# Patient Record
Sex: MALE | Race: OTHER | Employment: FULL TIME | URBAN - METROPOLITAN AREA
[De-identification: names, ages, dates, MRNs, and addresses within clinical notes are randomized per-mention and may not be internally consistent; named-entity substitution may affect disease eponyms.]

---

## 2019-07-17 ENCOUNTER — HOSPITAL ENCOUNTER (EMERGENCY)
Age: 34
Discharge: HOME HEALTH | End: 2019-07-17
Attending: EMERGENCY MEDICINE
Payer: COMMERCIAL

## 2019-07-17 ENCOUNTER — OFFICE VISIT (OUTPATIENT)
Dept: URGENT CARE | Age: 34
End: 2019-07-17

## 2019-07-17 VITALS
RESPIRATION RATE: 15 BRPM | HEART RATE: 72 BPM | WEIGHT: 309.53 LBS | HEIGHT: 68 IN | DIASTOLIC BLOOD PRESSURE: 79 MMHG | SYSTOLIC BLOOD PRESSURE: 124 MMHG | OXYGEN SATURATION: 98 % | BODY MASS INDEX: 46.91 KG/M2 | TEMPERATURE: 98.4 F

## 2019-07-17 DIAGNOSIS — R73.9 HYPERGLYCEMIA: Primary | ICD-10-CM

## 2019-07-17 DIAGNOSIS — R79.89 ELEVATED LFTS: ICD-10-CM

## 2019-07-17 LAB
ALBUMIN SERPL-MCNC: 3.7 G/DL (ref 3.5–5)
ALBUMIN/GLOB SERPL: 0.9 {RATIO} (ref 1.1–2.2)
ALP SERPL-CCNC: 111 U/L (ref 45–117)
ALT SERPL-CCNC: 122 U/L (ref 12–78)
ANION GAP SERPL CALC-SCNC: 6 MMOL/L (ref 5–15)
AST SERPL-CCNC: 51 U/L (ref 15–37)
BASOPHILS # BLD: 0 K/UL (ref 0–0.1)
BASOPHILS NFR BLD: 0 % (ref 0–1)
BILIRUB SERPL-MCNC: 1.2 MG/DL (ref 0.2–1)
BUN SERPL-MCNC: 15 MG/DL (ref 6–20)
BUN/CREAT SERPL: 14 (ref 12–20)
CALCIUM SERPL-MCNC: 8.9 MG/DL (ref 8.5–10.1)
CHLORIDE SERPL-SCNC: 103 MMOL/L (ref 97–108)
CO2 SERPL-SCNC: 25 MMOL/L (ref 21–32)
CREAT SERPL-MCNC: 1.11 MG/DL (ref 0.7–1.3)
DIFFERENTIAL METHOD BLD: NORMAL
EOSINOPHIL # BLD: 0.1 K/UL (ref 0–0.4)
EOSINOPHIL NFR BLD: 1 % (ref 0–7)
ERYTHROCYTE [DISTWIDTH] IN BLOOD BY AUTOMATED COUNT: 11.9 % (ref 11.5–14.5)
GLOBULIN SER CALC-MCNC: 4.3 G/DL (ref 2–4)
GLUCOSE SERPL-MCNC: 254 MG/DL (ref 65–100)
HCG SERPL QL: NEGATIVE
HCT VFR BLD AUTO: 45.7 % (ref 36.6–50.3)
HGB BLD-MCNC: 15.3 G/DL (ref 12.1–17)
IMM GRANULOCYTES # BLD AUTO: 0 K/UL (ref 0–0.04)
IMM GRANULOCYTES NFR BLD AUTO: 0 % (ref 0–0.5)
LYMPHOCYTES # BLD: 1.8 K/UL (ref 0.8–3.5)
LYMPHOCYTES NFR BLD: 27 % (ref 12–49)
MCH RBC QN AUTO: 29.8 PG (ref 26–34)
MCHC RBC AUTO-ENTMCNC: 33.5 G/DL (ref 30–36.5)
MCV RBC AUTO: 88.9 FL (ref 80–99)
MONOCYTES # BLD: 0.5 K/UL (ref 0–1)
MONOCYTES NFR BLD: 7 % (ref 5–13)
NEUTS SEG # BLD: 4.3 K/UL (ref 1.8–8)
NEUTS SEG NFR BLD: 65 % (ref 32–75)
NRBC # BLD: 0 K/UL (ref 0–0.01)
NRBC BLD-RTO: 0 PER 100 WBC
PLATELET # BLD AUTO: 273 K/UL (ref 150–400)
PMV BLD AUTO: 9.9 FL (ref 8.9–12.9)
POTASSIUM SERPL-SCNC: 3.9 MMOL/L (ref 3.5–5.1)
PROT SERPL-MCNC: 8 G/DL (ref 6.4–8.2)
RBC # BLD AUTO: 5.14 M/UL (ref 4.1–5.7)
SODIUM SERPL-SCNC: 134 MMOL/L (ref 136–145)
WBC # BLD AUTO: 6.7 K/UL (ref 4.1–11.1)

## 2019-07-17 PROCEDURE — 36415 COLL VENOUS BLD VENIPUNCTURE: CPT

## 2019-07-17 PROCEDURE — 74011250637 HC RX REV CODE- 250/637: Performed by: EMERGENCY MEDICINE

## 2019-07-17 PROCEDURE — 85025 COMPLETE CBC W/AUTO DIFF WBC: CPT

## 2019-07-17 PROCEDURE — 84703 CHORIONIC GONADOTROPIN ASSAY: CPT

## 2019-07-17 PROCEDURE — 99284 EMERGENCY DEPT VISIT MOD MDM: CPT

## 2019-07-17 PROCEDURE — 80053 COMPREHEN METABOLIC PANEL: CPT

## 2019-07-17 RX ORDER — METFORMIN HYDROCHLORIDE 500 MG/1
1000 TABLET ORAL
Status: COMPLETED | OUTPATIENT
Start: 2019-07-17 | End: 2019-07-17

## 2019-07-17 RX ORDER — METFORMIN HYDROCHLORIDE 500 MG/1
1000 TABLET ORAL 2 TIMES DAILY WITH MEALS
Qty: 120 TAB | Refills: 1 | Status: SHIPPED | OUTPATIENT
Start: 2019-07-17 | End: 2019-07-30 | Stop reason: DRUGHIGH

## 2019-07-17 RX ADMIN — METFORMIN HYDROCHLORIDE 1000 MG: 500 TABLET ORAL at 12:58

## 2019-07-17 NOTE — DISCHARGE INSTRUCTIONS
Patient Education        Learning About High Blood Sugar  What is high blood sugar? Your body turns the food you eat into glucose (sugar), which it uses for energy. But if your body isn't able to use the sugar right away, it can build up in your blood and lead to high blood sugar. When the amount of sugar in your blood stays too high for too much of the time, you may have diabetes. Diabetes is a disease that can cause serious health problems. The good news is that lifestyle changes may help you get your blood sugar back to normal and avoid or delay diabetes. What causes high blood sugar? Sugar (glucose) can build up in your blood if you:  · Are overweight. · Have a family history of diabetes. · Take certain medicines, such as steroids. What are the symptoms? Having high blood sugar may not cause any symptoms at all. Or it may make you feel very thirsty or very hungry. You may also urinate more often than usual, have blurry vision, or lose weight without trying. How is high blood sugar treated? You can take steps to lower your blood sugar level if you understand what makes it get higher. Your doctor may want you to learn how to test your blood sugar level at home. Then you can see how illness, stress, or different kinds of food or medicine raise or lower your blood sugar level. Other tests may be needed to see if you have diabetes. How can you prevent high blood sugar? · Watch your weight. If you're overweight, losing just a small amount of weight may help. Reducing fat around your waist is most important. · Limit the amount of calories, sweets, and unhealthy fat you eat. Ask your doctor if a dietitian can help you. A registered dietitian can help you create meal plans that fit your lifestyle. · Get at least 30 minutes of exercise on most days of the week. Exercise helps control your blood sugar. It also helps you maintain a healthy weight. Walking is a good choice.  You also may want to do other activities, such as running, swimming, cycling, or playing tennis or team sports. · If your doctor prescribed medicines, take them exactly as prescribed. Call your doctor if you think you are having a problem with your medicine. You will get more details on the specific medicines your doctor prescribes. Follow-up care is a key part of your treatment and safety. Be sure to make and go to all appointments, and call your doctor if you are having problems. It's also a good idea to know your test results and keep a list of the medicines you take. Where can you learn more? Go to http://rocael-rory.info/. Enter O108 in the search box to learn more about \"Learning About High Blood Sugar. \"  Current as of: July 25, 2018  Content Version: 11.9  © 2575-3100 [employer]. Care instructions adapted under license by [employer] (which disclaims liability or warranty for this information). If you have questions about a medical condition or this instruction, always ask your healthcare professional. [place] [address] any warranty or liability for your use of this information. Patient Education        Liver Function Tests: About These Tests  What is it? Liver function tests check how well your liver is working. Some tests measure the amount of certain enzymes in your blood to check whether the liver is damaged or inflamed. Other tests measure how well the liver can make important proteins or clear waste products from the body. Your doctor will use the test results to help look for certain conditions, such as hepatitis, cirrhosis, or gallbladder problems. Test results that are not normal do not always mean there is a problem with your liver. Your doctor can determine if there is a problem based on your results. The tests may include:  · Alkaline phosphatase (ALP). This test measures the amount of the enzyme ALP in your blood. · Total protein.  A total serum protein test measures the amounts of two major groups of proteins in your blood (albumin and globulin) and the total amount of protein. · Bilirubin. This test measures the amount of bilirubin in your blood. When bilirubin levels are high, the skin and whites of the eyes may appear yellow (jaundice). This may be caused by liver disease. · Aspartate aminotransferase (AST) and alanine aminotransferase (ALT). These tests measure the amount of the enzymes AST and ALT in your blood. (Aminotransferase is also known as a transaminase. High levels may be called transaminitis.)  Why is this test done? Liver function tests check how well your liver is working. Some tests help show whether your liver is damaged or inflamed. Your doctor may order liver function tests if you have symptoms of liver disease. These tests also may be done to see how well a treatment for liver disease is working. How can you prepare for the test?  In general, you do not need to prepare before having these tests. Your doctor may give you some specific instructions to prepare. What happens during the test?  A health professional takes a sample of your blood. What happens after the test?  You will probably be able to go home right away. When should you call for help? Watch closely for changes in your health, and be sure to contact your doctor if you have any problems. Follow-up care is a key part of your treatment and safety. Be sure to make and go to all appointments, and call your doctor if you are having problems. It's also a good idea to keep a list of the medicines you take. Ask your doctor when you can expect to have your test results. Where can you learn more? Go to http://rocael-rory.info/. Enter O816 in the search box to learn more about \"Liver Function Tests: About These Tests. \"  Current as of: June 25, 2018  Content Version: 11.9  © 7002-1426 [employer].  Care instructions adapted under license by [employer] (which disclaims liability or warranty for this information). If you have questions about a medical condition or this instruction, always ask your healthcare professional. [address] any warranty or liability for your use of this information.

## 2019-07-17 NOTE — PROGRESS NOTES
CM asked to see pt by MD due to need for PCP who takes current insurance. Pt does have an apt with Dr. Britton Pugh with [facility] for Oct 21,2:00pm  CM did reach out to MD office, per staff New pt appts are booked out until Oct,2019. CM did ask pt permission to reach out to [facility] for earlier apt if possible. Pt is agreeable. 1:50 CM contacted [facility], CM able to get apt with Dr. Zi Sexton for Sept. 26 at 11:00am. CM will relay message to pt. CM offered pt resources for free and low cost healthcare in the [facility]. Pt did accept resources. CM offered pt contact information to Diabetes treatment team. Pt is willing to see treatment team for education. CM left VM with Diabetes treatment team for pt education needs as well as possible need for medication assistance. 1:54 CM reached out to pt updated on apt date and time with [facility], CM did provide pt with [facility] phone number to contact to check on cancellations. CM updated pt on outreach to Diabetes treatment team. Pt verbalized understanding and appreciative of assitance    CM to remain available for support as needed    Cheryle Rilee.  RN, BSN  [facility]  [phone]

## 2019-07-17 NOTE — ED PROVIDER NOTES
EMERGENCY DEPARTMENT HISTORY AND PHYSICAL EXAM      Date: 7/17/2019  Patient Name: Ivy Phipps    History of Presenting Illness     Chief Complaint   Patient presents with    High Blood Sugar     Ambulatory into the ED with c/o BG in the 300's x last week. No obvious distress noted. History Provided By: Patient    HPI: Ivy Phipps, 37 y.o. male with PMHx significant for DM Type 2, without his metformin with elevated blood sugar and referred for blood sugar control. He tried to see PMD but they would not accept due to his Medicaid status and advised to go to ED. No fever, no cp/sob/abd pn or any other pain. Some mild increased urination. There are no other complaints, changes, or physical findings at this time. PCP: None    No current facility-administered medications on file prior to encounter. No current outpatient medications on file prior to encounter. Past History     Past Medical History:  No past medical history on file. Past Surgical History:  No past surgical history on file. Family History:  No family history on file. Social History:  Social History     Tobacco Use    Smoking status: Not on file   Substance Use Topics    Alcohol use: Not on file    Drug use: Not on file       Allergies:  No Known Allergies      Review of Systems   Review of Systems   Constitutional: Negative. HENT: Negative. Respiratory: Negative. Cardiovascular: Negative. Endocrine: Positive for polyuria. Genitourinary: Negative. Neurological: Negative. Hematological: Negative. All other systems reviewed and are negative. Physical Exam   Physical Exam   Vital signs and nursing notes reviewed    CONSTITUTIONAL: Alert, in mild distress; well-developed;  well-nourished. Obese body habitus  HEAD:  Normocephalic, atraumatic  EYES: PERRL; EOM's intact. ENTM: Nose: no rhinorrhea; Throat: no erythema or exudate, mucous membranes moist  Neck:  Supple. trachea is midline.   RESP: Chest clear, equal breath sounds. - W/R/R  CV: S1 and S2 WNL; No murmurs, gallops or rubs. 2+ radial and DP pulses bilaterally. GI: non-distended, normal bowel sounds, abdomen soft and non-tender. No masses or organomegaly. : No costo-vertebral angle tenderness. BACK:  Non-tender, normal appearance  UPPER EXT:  Normal inspection. no joint or soft tissue swelling  LOWER EXT: No edema, no calf tenderness. NEURO: Alert and oriented x3, 5/5 strength and light touch sensation intact in bilateral upper and lower extremities. SKIN: No rashes; Warm and dry  PSYCH: Normal mood, normal affect      Diagnostic Study Results     Labs -     Recent Results (from the past 12 hour(s))   CBC WITH AUTOMATED DIFF    Collection Time: 07/17/19 11:42 AM   Result Value Ref Range    WBC 6.7 4.1 - 11.1 K/uL    RBC 5.14 4.10 - 5.70 M/uL    HGB 15.3 12.1 - 17.0 g/dL    HCT 45.7 36.6 - 50.3 %    MCV 88.9 80.0 - 99.0 FL    MCH 29.8 26.0 - 34.0 PG    MCHC 33.5 30.0 - 36.5 g/dL    RDW 11.9 11.5 - 14.5 %    PLATELET 732 537 - 249 K/uL    MPV 9.9 8.9 - 12.9 FL    NRBC 0.0 0  WBC    ABSOLUTE NRBC 0.00 0.00 - 0.01 K/uL    NEUTROPHILS 65 32 - 75 %    LYMPHOCYTES 27 12 - 49 %    MONOCYTES 7 5 - 13 %    EOSINOPHILS 1 0 - 7 %    BASOPHILS 0 0 - 1 %    IMMATURE GRANULOCYTES 0 0.0 - 0.5 %    ABS. NEUTROPHILS 4.3 1.8 - 8.0 K/UL    ABS. LYMPHOCYTES 1.8 0.8 - 3.5 K/UL    ABS. MONOCYTES 0.5 0.0 - 1.0 K/UL    ABS. EOSINOPHILS 0.1 0.0 - 0.4 K/UL    ABS. BASOPHILS 0.0 0.0 - 0.1 K/UL    ABS. IMM.  GRANS. 0.0 0.00 - 0.04 K/UL    DF AUTOMATED     METABOLIC PANEL, COMPREHENSIVE    Collection Time: 07/17/19 11:42 AM   Result Value Ref Range    Sodium 134 (L) 136 - 145 mmol/L    Potassium 3.9 3.5 - 5.1 mmol/L    Chloride 103 97 - 108 mmol/L    CO2 25 21 - 32 mmol/L    Anion gap 6 5 - 15 mmol/L    Glucose 254 (H) 65 - 100 mg/dL    BUN 15 6 - 20 MG/DL    Creatinine 1.11 0.70 - 1.30 MG/DL    BUN/Creatinine ratio 14 12 - 20      GFR est AA >60 >60 ml/min/1.73m2    GFR est non-AA >60 >60 ml/min/1.73m2    Calcium 8.9 8.5 - 10.1 MG/DL    Bilirubin, total 1.2 (H) 0.2 - 1.0 MG/DL    ALT (SGPT) 122 (H) 12 - 78 U/L    AST (SGOT) 51 (H) 15 - 37 U/L    Alk. phosphatase 111 45 - 117 U/L    Protein, total 8.0 6.4 - 8.2 g/dL    Albumin 3.7 3.5 - 5.0 g/dL    Globulin 4.3 (H) 2.0 - 4.0 g/dL    A-G Ratio 0.9 (L) 1.1 - 2.2     HCG QL SERUM    Collection Time: 07/17/19 11:42 AM   Result Value Ref Range    HCG, Ql. NEGATIVE          Radiologic Studies -   No orders to display     CT Results  (Last 48 hours)    None        CXR Results  (Last 48 hours)    None            Medical Decision Making   I am the first provider for this patient. I reviewed the vital signs, available nursing notes, past medical history, past surgical history, family history and social history. Vital Signs-Reviewed the patient's vital signs. Patient Vitals for the past 12 hrs:   Temp Pulse Resp BP SpO2   07/17/19 1309 98.4 °F (36.9 °C) 72 15 124/79 98 %   07/17/19 1256  83 19  98 %   07/17/19 1250  85 17  99 %   07/17/19 1245 97.9 °F (36.6 °C) 85 21 134/85 97 %   07/17/19 1224  85 17  98 %   07/17/19 1215  89 21 145/88 97 %   07/17/19 1203     98 %   07/17/19 1202  84 18  97 %   07/17/19 1200  83 24 144/83 96 %   07/17/19 1145  82 26 141/86 98 %   07/17/19 1139  83 19 124/86 97 %   07/17/19 1123 98.2 °F (36.8 °C) 83 17 (!) 146/94 100 %       Pulse Oximetry Analysis - 98% on RA    Cardiac Monitor:   Rate: 72 bpm  Rhythm: NSR        Records Reviewed: nursing notes    Provider Notes (Medical Decision Making):   37 y.o male with hyperglycemia, not DKA,  to help with DM md follow-up, provided scrip for metformin. Mild LFT elevation, d/w patient, not concerned with acute emerg today but does need outpt w/u vs [facility]. ED Course:   Initial assessment performed.  The patients presenting problems have been discussed, and they are in agreement with the care plan formulated and outlined with them. I have encouraged them to ask questions as they arise throughout their visit. Disposition:  Discharge    Discharge Note:  2:00 PM  The pt is ready for discharge. The pt's signs, symptoms, diagnosis, and discharge instructions have been discussed and pt has conveyed their understanding. The pt is to follow up as recommended or return to ER should their symptoms worsen. Plan has been discussed and pt is in agreement. PLAN:  1. Current Discharge Medication List      START taking these medications    Details   metFORMIN (GLUCOPHAGE) 500 mg tablet Take 2 Tabs by mouth two (2) times daily (with meals) for 30 days. Qty: 120 Tab, Refills: 1           2. Follow-up Information     Follow up With Specialties Details Why Contact Info    [facility] EMERGENCY DEPT Emergency Medicine  If symptoms worsen including a blood sugar greater than 350 that persists despite starting her metformin again. [address] [place] [address]  [phone]    None   Please find a new primary care physician. Please discuss your diabetic medication with them as well as your abnormal liver function seen and today's evaluation in the emergency department. You may need an outpatient ultrasound. None ([phone]) Patient stated that they have no PCP          Return to ED if worse     Diagnosis     Clinical Impression:   1. Hyperglycemia    2.  Elevated LFTs

## 2019-07-30 ENCOUNTER — OFFICE VISIT (OUTPATIENT)
Dept: INTERNAL MEDICINE CLINIC | Age: 34
End: 2019-07-30

## 2019-07-30 VITALS
BODY MASS INDEX: 47.74 KG/M2 | SYSTOLIC BLOOD PRESSURE: 122 MMHG | WEIGHT: 315 LBS | HEIGHT: 68 IN | HEART RATE: 89 BPM | RESPIRATION RATE: 18 BRPM | DIASTOLIC BLOOD PRESSURE: 82 MMHG | TEMPERATURE: 97.8 F | OXYGEN SATURATION: 99 %

## 2019-07-30 DIAGNOSIS — E11.65 UNCONTROLLED TYPE 2 DIABETES MELLITUS WITH HYPERGLYCEMIA (HCC): Primary | ICD-10-CM

## 2019-07-30 DIAGNOSIS — E66.01 OBESITY, MORBID (HCC): ICD-10-CM

## 2019-07-30 DIAGNOSIS — N52.9 ERECTILE DYSFUNCTION, UNSPECIFIED ERECTILE DYSFUNCTION TYPE: ICD-10-CM

## 2019-07-30 RX ORDER — INSULIN DEGLUDEC 100 U/ML
INJECTION, SOLUTION SUBCUTANEOUS
COMMUNITY
End: 2019-08-28 | Stop reason: SDUPTHER

## 2019-07-30 RX ORDER — METFORMIN HYDROCHLORIDE 500 MG/1
1000 TABLET, EXTENDED RELEASE ORAL 2 TIMES DAILY
Qty: 120 TAB | Refills: 4 | Status: SHIPPED | OUTPATIENT
Start: 2019-07-30 | End: 2019-12-29

## 2019-07-30 RX ORDER — SILDENAFIL CITRATE 20 MG/1
20 TABLET ORAL
Qty: 30 TAB | Refills: 3 | Status: SHIPPED | OUTPATIENT
Start: 2019-07-30 | End: 2019-11-18 | Stop reason: SDUPTHER

## 2019-07-30 RX ORDER — SILDENAFIL CITRATE 20 MG/1
TABLET ORAL
Refills: 3 | COMMUNITY
Start: 2019-07-05 | End: 2019-07-30 | Stop reason: SDUPTHER

## 2019-07-30 NOTE — PROGRESS NOTES
Mr. Ivy Phipps is a new patient who is here to establish care. CC:  Establish Care and Diabetes       HPI:    Diabetes for 1.5 years. Patient was seen in ER July 17th and blood sugar was in the 500 range. Prior to ER visit patient was only tresiba 24 units at night time. When first diagnosed he was only on metformin and tresiba then taken off of metformin   Since ER visit taking 1000mg BID   Reports blood sugar are in the 200 range currently. Feels well. Patient also has ED since diagnosis of diabetes. Takes cialis as needed. Able to obtain it with a coupon    Previous PCP does not accept insurance    Reviewed social hx   Reviewed family hx     Review of systems:  Constitutional: negative for fever, chills, weight loss, night sweats   Eyes : negative for vision changes, eye pain and discharge  Nose and Throat: negative for tinnitus, sore throat   Cardiovascular: negative for chest pain, palpitations and shortness of breath  Respiratory: negative for shortness of breath, cough and wheezing   Gastroinstestinal: negative for abdominal pain, nausea, vomiting, diarrhea, constipation, and blood in the stool  Musculoskeletal: negative for back ache and joint ache   Genitourinary: negative for dysuria, nocturia, polyuria and hematuria   Neurologic: Negative for focal weakness, numbness or incoordination  Skin: negative for rash, pruritus  Hematologic: negative for easy bruising      Past Medical History:   Diagnosis Date    Erectile dysfunction     Type 2 diabetes mellitus ([facility] [address]         History reviewed. No pertinent surgical history. No Known Allergies    Current Outpatient Medications on File Prior to Visit   Medication Sig Dispense Refill    insulin degludec (TRESIBA FLEXTOUCH U-100) 100 unit/mL (3 mL) in by SubCUTAneous route. Indications: 35 unit       No current facility-administered medications on file prior to visit.         family history includes Diabetes in his maternal grandfather, maternal grandmother, paternal grandfather, and paternal grandmother. Social History     Socioeconomic History    Marital status:      Spouse name: Not on file    Number of children: Not on file    Years of education: Not on file    Highest education level: Not on file   Occupational History    Not on file   Social Needs    Financial resource strain: Not on file    Food insecurity:     Worry: Not on file     Inability: Not on file    Transportation needs:     Medical: Not on file     Non-medical: Not on file   Tobacco Use    Smoking status: Never Smoker    Smokeless tobacco: Never Used   Substance and Sexual Activity    Alcohol use: Yes    Drug use: Never    Sexual activity: Yes     Partners: Female   Lifestyle    Physical activity:     Days per week: Not on file     Minutes per session: Not on file    Stress: Not on file   Relationships    Social connections:     Talks on phone: Not on file     Gets together: Not on file     Attends Hindu service: Not on file     Active member of club or organization: Not on file     Attends meetings of clubs or organizations: Not on file     Relationship status: Not on file    Intimate partner violence:     Fear of current or ex partner: Not on file     Emotionally abused: Not on file     Physically abused: Not on file     Forced sexual activity: Not on file   Other Topics Concern    Not on file   Social History Narrative    Not on file       Visit Vitals  /82 (BP 1 Location: Right arm, BP Patient Position: Sitting)   Pulse 89   Temp 97.8 °F (36.6 °C) (Oral)   Resp 18   Ht 5' 8\" (1.727 m)   Wt 317 lb (143.8 kg)   SpO2 99%   BMI 48.20 kg/m²     General:  Well appearing male no acute distress  HEENT:   PERRL,normal conjunctiva. External ear and canals normal, TMs normal.  Hearing normal to voice. Nose without edema or discharge, normal septum. Lips, teeth, gums normal.  Oropharynx: no erythema, no exudates, no lesions, normal tongue. Neck:  Supple. Thyroid normal size, nontender, without nodules. No carotid bruit. No masses or lymphadenopathy  Respiratory: no respiratory distress,  no wheezing, no rhonchi, no rales. No chest wall tenderness. Cardiovascular:  RRR, normal S1S2, no murmur. Gastrointestinal: normal bowel sounds, soft, nontender, without masses. No hepatosplenomegaly. Extremities +2 pulses, no edema, normal sensation   Musculoskeletal:  Normal gait. Normal digits and nails. Normal strength and tone, no atrophy, and no abnormal movement. Skin:  No rash, no lesions, no ulcers. Skin warm, normal turgor, without induration or nodules. Neuro:  A and OX4, fluent speech, cranial nerves normal 2-12. Sensation normal to light touch. DTR symmetrical  Psych:  Normal affect      Lab Results   Component Value Date/Time    WBC 6.7 07/17/2019 11:42 AM    HGB 15.3 07/17/2019 11:42 AM    HCT 45.7 07/17/2019 11:42 AM    PLATELET 582 91/43/7739 11:42 AM    MCV 88.9 07/17/2019 11:42 AM     Lab Results   Component Value Date/Time    Sodium 134 (L) 07/17/2019 11:42 AM    Potassium 3.9 07/17/2019 11:42 AM    Chloride 103 07/17/2019 11:42 AM    CO2 25 07/17/2019 11:42 AM    Anion gap 6 07/17/2019 11:42 AM    Glucose 254 (H) 07/17/2019 11:42 AM    BUN 15 07/17/2019 11:42 AM    Creatinine 1.11 07/17/2019 11:42 AM    BUN/Creatinine ratio 14 07/17/2019 11:42 AM    GFR est AA >60 07/17/2019 11:42 AM    GFR est non-AA >60 07/17/2019 11:42 AM    Calcium 8.9 07/17/2019 11:42 AM     No results found for: CHOL, CHOLPOCT, CHOLX, CHLST, CHOLV, HDL, HDLPOC, LDL, LDLCPOC, LDLC, DLDLP, VLDLC, VLDL, TGLX, TRIGL, TRIGP, TGLPOCT, CHHD, CHHDX  No results found for: TSH, TSH2, TSH3, TSHP, TSHEXT, TSHEXT  No results found for: HBA1C, HGBE8, PHZ4AYGV, YDZ7LGQJ, ZPK6SSVJ  No results found for: VITD3, XQVID2, XQVID3, XQVID, VD3RIA                Assessment and Plan:     1. Obesity, morbid ([address]  Counseled on healthy eating, reports loosing weight in past few years.  Encouraged to exercise    2. Uncontrolled type 2 diabetes mellitus with hyperglycemia ([facility] [address]- change to long acting formulation, continue tresiba at 35 units for now and add ozempic to treat insulin resistance. Follow up in  4-6 weeks - will check cholesterol then   - metFORMIN ER (GLUCOPHAGE XR) 500 mg tablet; Take 2 Tabs by mouth two (2) times a day. Dispense: 120 Tab; Refill: 4  - semaglutide (OZEMPIC) 0.25 mg/0.2 mL (2 mg/1.5 mL) sub-q pen; 0.25 mg by SubCUTAneous route every seven (7) days. Dispense: 1 Box; Refill: 1    3. Erectile dysfunction, unspecified erectile dysfunction type  - sildenafil, antihypertensive, (REVATIO) 20 mg tablet; Take 1 Tab by mouth daily as needed (erectile dysfunction). Dispense: 30 Tab; Refill: 3    4. Mild elevation in liver enzymes: I suspect this is due to fatty liver, asymptomatic. Will repeat at follow up / if persists will order US and hep serologies    Follow-up and Dispositions    · Return in about 1 month (around 8/27/2019) for diabetes .           Christiano Khan MD

## 2019-07-30 NOTE — PROGRESS NOTES
Reviewed record in preparation for visit and have obtained necessary documentation. Identified pt with two pt identifiers(name and ). Chief Complaint   Patient presents with   [facility]       Health Maintenance Due   Topic Date Due    DTaP/Tdap/Td  (1 - Tdap) 2004       Mr. Ny Arenas has a reminder for a \"due or due soon\" health maintenance. I have asked that he discuss this further with his primary care provider for follow-up on this health maintenance. Coordination of Care Questionnaire:  :     1) Have you been to an emergency room, urgent care clinic since your last visit? no   Hospitalized since your last visit? no             2) Have you seen or consulted any other health care providers outside of [address] [place] [address] since your last visit? no  (Include any pap smears or colon screenings in this section.)    3) In the event something were to happen to you and you were unable to speak on your behalf, do you have an Advance Directive/ Living Will in place stating your wishes? NO    Do you have an Advance Directive on file? no    4) Are you interested in receiving information on Advance Directives? NO    Patient is accompanied by self I have received verbal consent from Maximiliano Pozo to discuss any/all medical information while they are present in the room.

## 2019-07-30 NOTE — PATIENT INSTRUCTIONS
Start ozempic once a week - typical to have nausea when you first start medication, eat small meals  Changed the formulation of metformin to long acting   Continue 25 units of tresiba    [employer] calls/patient messages:            Please allow up to 24 hours for someone in the office to contact you about your call or message. Be mindful your provider may be out of the office or your message may require further review. We encourage you to use nScaled for your messages as this is a faster, more efficient way to communicate with our office                         Medication Refills:            Prescription medications require 48-72 business hours to process. We encourage you to use nScaled for your refills. For controlled medications: Please allow 72 business hours to process. Certain medications may require you to  a written prescription at our office. NO narcotic/controlled medications will be prescribed after 4pm Monday through Friday or on weekends              Form/Paperwork Completion:            Please note a $25 fee may incur for all paperwork for completed by our providers. We ask that you allow 7-10 business days. Pre-payment is due prior to picking up/faxing the completed form. You may also download your forms to nScaled to have your doctor print off.

## 2019-08-28 NOTE — TELEPHONE ENCOUNTER
----- Message from Gina Murphy sent at 8/28/2019  3:00 PM EDT -----  Regarding: Dr. Ortiz Grief (if not patient):      Relationship of caller (if not patient):      Best contact number(s):      Name of medication and dosage if known:      Is patient out of this medication (yes/no):      Pharmacy name:[facility]     Pharmacy listed in chart? (yes/no):yes  Pharmacy phone number: [phone]      Details to clarify the request: Patient needs a refill of Orquidea Terrazas

## 2019-08-29 RX ORDER — INSULIN DEGLUDEC 100 U/ML
35 INJECTION, SOLUTION SUBCUTANEOUS DAILY
Qty: 4 PEN | Refills: 4 | Status: SHIPPED | OUTPATIENT
Start: 2019-08-29 | End: 2019-12-05 | Stop reason: ALTCHOICE

## 2019-08-30 ENCOUNTER — OFFICE VISIT (OUTPATIENT)
Dept: INTERNAL MEDICINE CLINIC | Age: 34
End: 2019-08-30

## 2019-08-30 VITALS
RESPIRATION RATE: 18 BRPM | SYSTOLIC BLOOD PRESSURE: 127 MMHG | OXYGEN SATURATION: 99 % | HEIGHT: 68 IN | DIASTOLIC BLOOD PRESSURE: 85 MMHG | WEIGHT: 315 LBS | HEART RATE: 100 BPM | TEMPERATURE: 98.1 F | BODY MASS INDEX: 47.74 KG/M2

## 2019-08-30 DIAGNOSIS — E66.01 OBESITY, MORBID (HCC): ICD-10-CM

## 2019-08-30 DIAGNOSIS — Z23 ENCOUNTER FOR IMMUNIZATION: ICD-10-CM

## 2019-08-30 DIAGNOSIS — E11.65 UNCONTROLLED TYPE 2 DIABETES MELLITUS WITH HYPERGLYCEMIA (HCC): Primary | ICD-10-CM

## 2019-08-30 NOTE — PATIENT INSTRUCTIONS
Your numbers look a lot better! Continue same regimen  We may add cholesterol medication pending results    May take zantac over the counter 150mg twice a day for reflux    Office Policies    Phone calls/patient messages:            Please allow up to 24 hours for someone in the office to contact you about your call or message. Be mindful your provider may be out of the office or your message may require further review. We encourage you to use [employer] for your messages as this is a faster, more efficient way to communicate with our office                         Medication Refills:            Prescription medications require 48-72 business hours to process. We encourage you to use [employer] for your refills. For controlled medications: Please allow 72 business hours to process. Certain medications may require you to  a written prescription at our office. NO narcotic/controlled medications will be prescribed after 4pm Monday through Friday or on weekends              Form/Paperwork Completion:            Please note a $25 fee may incur for all paperwork for completed by our providers. We ask that you allow 7-10 business days. Pre-payment is due prior to picking up/faxing the completed form. You may also download your forms to [employer] to have your doctor print off.

## 2019-08-30 NOTE — PROGRESS NOTES
Mr. Georgie Fan is presenting to follow up     CC:  Diabetes       HPI:  Recall patient presented to [facility] on 07/30   Diabetes was uncontrolled   Patient was seen in ER July 17th and blood sugar was in the 500 range. Prior to ER visit patient was taking tresiba 24 units at night time. ER added metformin which caused GI upset  Our last visit in July we changed to metformin long acting which improved the GI upset and started on ozempic 0.25  Increased to 35 units of tresiba  Has been on this regimen for one month    Bllod sugar is running from 99 - 178 average 140s  Checking blood sugar fasting in the morning and two hours after eating  We reviewed his diet  Breakfast- bagel or sandwich  Lunch: sometimes eats fries at [employer], wraps at [employer]: cooks dinner ( protein and vegetables, sometimes spaghetti)  We discussed dietary changes     Patient also has ED since diagnosis of diabetes. Takes cialis as needed        Reviewed social hx and no changes    Reviewed family hx     Review of systems:  10 systems reviewed and negative other than HPI      Past Medical History:   Diagnosis Date    Erectile dysfunction     Type 2 diabetes mellitus ([facility] [address]         History reviewed. No pertinent surgical history. No Known Allergies    Current Outpatient Medications on File Prior to Visit   Medication Sig Dispense Refill    insulin degludec (TRESIBA FLEXTOUCH U-100) 100 unit/mL (3 mL) inpn 35 Units by SubCUTAneous route daily. Indications: 35 unit 4 Pen 4    metFORMIN ER (GLUCOPHAGE XR) 500 mg tablet Take 2 Tabs by mouth two (2) times a day. 120 Tab 4    semaglutide (OZEMPIC) 0.25 mg/0.2 mL (2 mg/1.5 mL) sub-q pen 0.25 mg by SubCUTAneous route every seven (7) days. 1 Box 1    sildenafil, antihypertensive, (REVATIO) 20 mg tablet Take 1 Tab by mouth daily as needed (erectile dysfunction). 30 Tab 3     No current facility-administered medications on file prior to visit.         family history includes Diabetes in his maternal grandfather, maternal grandmother, paternal grandfather, and paternal grandmother. Social History     Socioeconomic History    Marital status:      Spouse name: Not on file    Number of children: Not on file    Years of education: Not on file    Highest education level: Not on file   Occupational History    Not on file   Social Needs    Financial resource strain: Not on file    Food insecurity:     Worry: Not on file     Inability: Not on file    Transportation needs:     Medical: Not on file     Non-medical: Not on file   Tobacco Use    Smoking status: Never Smoker    Smokeless tobacco: Never Used   Substance and Sexual Activity    Alcohol use: Yes    Drug use: Never    Sexual activity: Yes     Partners: Female   Lifestyle    Physical activity:     Days per week: Not on file     Minutes per session: Not on file    Stress: Not on file   Relationships    Social connections:     Talks on phone: Not on file     Gets together: Not on file     Attends Gnosticist service: Not on file     Active member of club or organization: Not on file     Attends meetings of clubs or organizations: Not on file     Relationship status: Not on file    Intimate partner violence:     Fear of current or ex partner: Not on file     Emotionally abused: Not on file     Physically abused: Not on file     Forced sexual activity: Not on file   Other Topics Concern    Not on file   Social History Narrative    Not on file       Visit Vitals  /85 (BP 1 Location: Right arm, BP Patient Position: Sitting)   Pulse 100   Temp 98.1 °F (36.7 °C) (Oral)   Resp 18   Ht 5' 8\" (1.727 m)   Wt 318 lb (144.2 kg)   SpO2 99%   BMI 48.35 kg/m²     General:  Well appearing male no acute distress  Respiratory: no respiratory distress,  no wheezing, no rhonchi, no rales. No chest wall tenderness. Cardiovascular:  RRR, normal S1S2, no murmur.     Gastrointestinal: normal bowel sounds, soft, nontender, without masses. No hepatosplenomegaly. Extremities +2 pulses, no edema, normal sensation   Skin:  No rash, no lesions, no ulcers. Skin warm, normal turgor, without induration or nodules. Psych:  Normal affect        Monofilament R - normal sensation with micro filament  L - normal sensation with micro filament   Pulse DP R - 2+ (normal)  L - 2+ (normal)   Pulse TP R - 2+ (normal)  L - 2+ (normal)   Structural deformity R - None  L - None   Skin Integrity / Deformity R - None  L - None     Lab Results   Component Value Date/Time    WBC 6.7 07/17/2019 11:42 AM    HGB 15.3 07/17/2019 11:42 AM    HCT 45.7 07/17/2019 11:42 AM    PLATELET 148 74/79/0924 11:42 AM    MCV 88.9 07/17/2019 11:42 AM     Lab Results   Component Value Date/Time    Sodium 134 (L) 07/17/2019 11:42 AM    Potassium 3.9 07/17/2019 11:42 AM    Chloride 103 07/17/2019 11:42 AM    CO2 25 07/17/2019 11:42 AM    Anion gap 6 07/17/2019 11:42 AM    Glucose 254 (H) 07/17/2019 11:42 AM    BUN 15 07/17/2019 11:42 AM    Creatinine 1.11 07/17/2019 11:42 AM    BUN/Creatinine ratio 14 07/17/2019 11:42 AM    GFR est AA >60 07/17/2019 11:42 AM    GFR est non-AA >60 07/17/2019 11:42 AM    Calcium 8.9 07/17/2019 11:42 AM     No results found for: CHOL, CHOLPOCT, CHOLX, CHLST, CHOLV, HDL, HDLPOC, LDL, LDLCPOC, LDLC, DLDLP, VLDLC, VLDL, TGLX, TRIGL, TRIGP, TGLPOCT, CHHD, CHHDX  No results found for: TSH, TSH2, TSH3, TSHP, TSHEXT, TSHEXT  No results found for: HBA1C, HGBE8, TQX4JJAM, JYW6BDOH, NCR9FECL  No results found for: VITD3, XQVID2, XQVID3, XQVID, VD3RIA                Assessment and Plan:     38 yo male with a hx of diabetes type 2, ED and obesity presenting to follow up     Uncontrolled type 2 diabetes mellitus with hyperglycemia ([facility] [address]- Doing much better, average blood sugar is 130. He is to continue tresiba at 35 units and ozempic to treat insulin resistance.  Continue metformin  2000 mg /daily  -foot exam done today and normal  - reminded to schedule eye exam  - discussed dietary changes ( less carbs)  - follow up in 3 months    Erectile dysfunction, unspecified erectile dysfunction type  - sildenafil, antihypertensive, (REVATIO) 20 mg tablet; as needed     Mild elevation in liver enzymes: I suspect this is due to fatty liver, asymptomatic.  Check today    Checking lipid panel discussed that if elevated will start statin     In addition pneumovax administered    Follow up in 3 months        Rosalind Ordoñez MD

## 2019-08-30 NOTE — PROGRESS NOTES
Reviewed record in preparation for visit and have obtained necessary documentation. Identified pt with two pt identifiers(name and ). Chief Complaint   Patient presents with   [address] [place] [address] Diabetes       Health Maintenance Due   Topic Date Due    Hemoglobin A1C    1983    Cholesterol Test   1983    Pneumococcal Vaccine (1 of 1 - PPSV23) 1989    Diabetic Foot Care  1993    Albumin Urine Test  1993    Eye Exam  1993    DTaP/Tdap/Td  (1 - Tdap) 2004    Flu Vaccine  2019       Mr. Lita Stallworth has a reminder for a \"due or due soon\" health maintenance. I have asked that he discuss this further with his primary care provider for follow-up on this health maintenance. Coordination of Care Questionnaire:  :     1) Have you been to an emergency room, urgent care clinic since your last visit? no   Hospitalized since your last visit? no             2) Have you seen or consulted any other health care providers outside of [address] [place] [address] since your last visit? no  (Include any pap smears or colon screenings in this section.)    3) In the event something were to happen to you and you were unable to speak on your behalf, do you have an Advance Directive/ Living Will in place stating your wishes? NO    Do you have an Advance Directive on file? no    4) Are you interested in receiving information on Advance Directives? NO    Patient is accompanied by self I have received verbal consent from Wing Willson to discuss any/all medical information while they are present in the room.

## 2019-08-31 LAB
ALBUMIN SERPL-MCNC: 4.5 G/DL (ref 3.5–5.5)
ALBUMIN/CREAT UR: 2.8 MG/G CREAT (ref 0–30)
ALBUMIN/GLOB SERPL: 1.4 {RATIO} (ref 1.2–2.2)
ALP SERPL-CCNC: 105 IU/L (ref 39–117)
ALT SERPL-CCNC: 72 IU/L (ref 0–44)
AST SERPL-CCNC: 36 IU/L (ref 0–40)
BILIRUB SERPL-MCNC: 0.5 MG/DL (ref 0–1.2)
BUN SERPL-MCNC: 15 MG/DL (ref 6–20)
BUN/CREAT SERPL: 14 (ref 9–20)
CALCIUM SERPL-MCNC: 9.7 MG/DL (ref 8.7–10.2)
CHLORIDE SERPL-SCNC: 100 MMOL/L (ref 96–106)
CHOLEST SERPL-MCNC: 168 MG/DL (ref 100–199)
CO2 SERPL-SCNC: 23 MMOL/L (ref 20–29)
CREAT SERPL-MCNC: 1.06 MG/DL (ref 0.76–1.27)
CREAT UR-MCNC: 119 MG/DL
GLOBULIN SER CALC-MCNC: 3.2 G/DL (ref 1.5–4.5)
GLUCOSE SERPL-MCNC: 94 MG/DL (ref 65–99)
HBA1C MFR BLD: 8.1 % (ref 4.8–5.6)
HDLC SERPL-MCNC: 35 MG/DL
LDLC SERPL CALC-MCNC: 109 MG/DL (ref 0–99)
MICROALBUMIN UR-MCNC: 3.3 UG/ML
POTASSIUM SERPL-SCNC: 4.8 MMOL/L (ref 3.5–5.2)
PROT SERPL-MCNC: 7.7 G/DL (ref 6–8.5)
SODIUM SERPL-SCNC: 141 MMOL/L (ref 134–144)
TRIGL SERPL-MCNC: 118 MG/DL (ref 0–149)
VLDLC SERPL CALC-MCNC: 24 MG/DL (ref 5–40)

## 2019-09-12 NOTE — PROGRESS NOTES
no evidence of diabetes affecting the kidneys  Elevated ALT is trending down.   Diabetes control is not optimal but improving - continue current therapy and follow up in 3months  Cholesterol is close to goal

## 2019-11-17 ENCOUNTER — HOSPITAL ENCOUNTER (EMERGENCY)
Age: 34
Discharge: HOME | End: 2019-11-17
Attending: EMERGENCY MEDICINE
Payer: COMMERCIAL

## 2019-11-17 VITALS
WEIGHT: 315 LBS | TEMPERATURE: 99 F | BODY MASS INDEX: 47.74 KG/M2 | SYSTOLIC BLOOD PRESSURE: 158 MMHG | DIASTOLIC BLOOD PRESSURE: 86 MMHG | OXYGEN SATURATION: 99 % | RESPIRATION RATE: 20 BRPM | HEART RATE: 90 BPM | HEIGHT: 68 IN

## 2019-11-17 DIAGNOSIS — M54.41 ACUTE RIGHT-SIDED LOW BACK PAIN WITH RIGHT-SIDED SCIATICA: Primary | ICD-10-CM

## 2019-11-17 PROCEDURE — 96374 THER/PROPH/DIAG INJ IV PUSH: CPT

## 2019-11-17 PROCEDURE — 99282 EMERGENCY DEPT VISIT SF MDM: CPT

## 2019-11-17 PROCEDURE — 74011250636 HC RX REV CODE- 250/636: Performed by: PHYSICIAN ASSISTANT

## 2019-11-17 RX ORDER — PREDNISONE 10 MG/1
TABLET ORAL
Qty: 21 TAB | Refills: 0 | Status: SHIPPED | OUTPATIENT
Start: 2019-11-17 | End: 2019-12-05 | Stop reason: ALTCHOICE

## 2019-11-17 RX ORDER — KETOROLAC TROMETHAMINE 30 MG/ML
30 INJECTION, SOLUTION INTRAMUSCULAR; INTRAVENOUS
Status: COMPLETED | OUTPATIENT
Start: 2019-11-17 | End: 2019-11-17

## 2019-11-17 RX ORDER — IBUPROFEN 800 MG/1
800 TABLET ORAL
Qty: 20 TAB | Refills: 0 | Status: SHIPPED | OUTPATIENT
Start: 2019-11-17 | End: 2019-11-24

## 2019-11-17 RX ORDER — CYCLOBENZAPRINE HCL 10 MG
10 TABLET ORAL
Qty: 20 TAB | Refills: 0 | Status: SHIPPED | OUTPATIENT
Start: 2019-11-17 | End: 2019-12-05 | Stop reason: ALTCHOICE

## 2019-11-17 RX ADMIN — KETOROLAC TROMETHAMINE 30 MG: 30 INJECTION, SOLUTION INTRAMUSCULAR at 14:40

## 2019-11-17 NOTE — DISCHARGE INSTRUCTIONS
Patient Education        Sciatica: Care Instructions  Your Care Instructions    Sciatica (say \"rhz-ID-xg-kuh\") is an irritation of one of the sciatic nerves, which come from the spinal cord in the lower back. The sciatic nerves and their branches extend down through the buttock to the foot. Sciatica can develop when an injured disc in the back presses against a spinal nerve root. Its main symptom is pain, numbness, or weakness that is often worse in the leg or foot than in the back. Sciatica often will improve and go away with time. Early treatment usually includes medicines and exercises to relieve pain. Follow-up care is a key part of your treatment and safety. Be sure to make and go to all appointments, and call your doctor if you are having problems. It's also a good idea to know your test results and keep a list of the medicines you take. How can you care for yourself at home? · Take pain medicines exactly as directed. ? If the doctor gave you a prescription medicine for pain, take it as prescribed. ? If you are not taking a prescription pain medicine, ask your doctor if you can take an over-the-counter medicine. · Use heat or ice to relieve pain. ? To apply heat, put a warm water bottle, heating pad set on low, or warm cloth on your back. Do not go to sleep with a heating pad on your skin. ? To use ice, put ice or a cold pack on the area for 10 to 20 minutes at a time. Put a thin cloth between the ice and your skin. · Avoid sitting if possible, unless it feels better than standing. · Alternate lying down with short walks. Increase your walking distance as you are able to without making your symptoms worse. · Do not do anything that makes your symptoms worse. When should you call for help? Call 911 anytime you think you may need emergency care.  For example, call if:    · You are unable to move a leg at all.   [facility] your doctor now or seek immediate medical care if:    · You have new or worse symptoms in your legs or buttocks. Symptoms may include:  ? Numbness or tingling. ? Weakness. ? Pain.     · You lose bladder or bowel control.    Watch closely for changes in your health, and be sure to contact your doctor if:    · You are not getting better as expected. Where can you learn more? Go to http://rocael-rory.info/. Enter [phone] in the search box to learn more about \"Sciatica: Care Instructions. \"  Current as of: June 26, 2019  Content Version: 12.2  © 0104-8610 [employer]. Care instructions adapted under license by [employer] (which disclaims liability or warranty for this information). If you have questions about a medical condition or this instruction, always ask your healthcare professional. [address] any warranty or liability for your use of this information.

## 2019-11-17 NOTE — ED TRIAGE NOTES
Assumed care of pt from triage. Pt is A&O x 4. Pt reports CC of lower right sided back pain radiating down right leg. Pt denies injury. Pt denies any previous injuries.

## 2019-11-17 NOTE — ED PROVIDER NOTES
EMERGENCY DEPARTMENT HISTORY AND PHYSICAL EXAM      Date: 11/17/2019  Patient Name: Collette Griffith    History of Presenting Illness     Chief Complaint   Patient presents with    Back Pain     right sided lower hip pain radiates to down right leg, no injury       History Provided By: Patient    HPI: Collette Griffith, 37 y.o. male with PMHx significant for type 2 diabetes, presents to the ED with cc of low back pain. The patient was doing housework yesterday when he began having right lower back pain that radiates into his right lateral thigh. Pain has gradually worsened and is worse with movement and ambulation. He did not try any medications to help with the pain. He reports occasional paresthesias to his right foot. He has never had similar symptoms in the past.  He denies injury, fever, lower extremity weakness, bowel or bladder dysfunction, saddle anesthesia, history of IV drug abuse. There are no other complaints, changes, or physical findings at this time. PCP: Juan Carlos Ruvalcaba MD    No current facility-administered medications on file prior to encounter. Current Outpatient Medications on File Prior to Encounter   Medication Sig Dispense Refill    insulin degludec (TRESIBA FLEXTOUCH U-100) 100 unit/mL (3 mL) inpn 35 Units by SubCUTAneous route daily. Indications: 35 unit 4 Pen 4    metFORMIN ER (GLUCOPHAGE XR) 500 mg tablet Take 2 Tabs by mouth two (2) times a day. 120 Tab 4    semaglutide (OZEMPIC) 0.25 mg/0.2 mL (2 mg/1.5 mL) sub-q pen 0.25 mg by SubCUTAneous route every seven (7) days. 1 Box 1    sildenafil, antihypertensive, (REVATIO) 20 mg tablet Take 1 Tab by mouth daily as needed (erectile dysfunction). 30 Tab 3       Past History     Past Medical History:  Past Medical History:   Diagnosis Date    Erectile dysfunction     Type 2 diabetes mellitus ([facility] [address]        Past Surgical History:  No past surgical history on file.     Family History:  Family History   Problem Relation Age of Onset  Diabetes Maternal Grandmother     Diabetes Maternal Grandfather     Diabetes Paternal Grandmother     Diabetes Paternal Grandfather        Social History:  Social History     Tobacco Use    Smoking status: Never Smoker    Smokeless tobacco: Never Used   Substance Use Topics    Alcohol use: Yes    Drug use: Never       Allergies:  No Known Allergies      Review of Systems   Review of Systems   Constitutional: Negative for chills and fever. HENT: Negative for ear pain and sore throat. Eyes: Negative for redness and visual disturbance. Respiratory: Negative for cough and shortness of breath. Cardiovascular: Negative for chest pain and palpitations. Gastrointestinal: Negative for abdominal pain, nausea and vomiting. Genitourinary: Negative for dysuria and hematuria. Musculoskeletal: Positive for back pain. Negative for gait problem. Skin: Negative for rash and wound. Neurological: Negative for dizziness, weakness, numbness and headaches. Psychiatric/Behavioral: Negative for behavioral problems and confusion. All other systems reviewed and are negative. Physical Exam   Physical Exam   Constitutional: He is oriented to person, place, and time. He appears well-developed and well-nourished. Alert, conversant male who is slightly uncomfortable appearing. HENT:   Head: Normocephalic and atraumatic. Eyes: Pupils are equal, round, and reactive to light. Conjunctivae and EOM are normal.   Neck: Normal range of motion. Neck supple. Cardiovascular: Normal rate, regular rhythm and normal heart sounds. Pulmonary/Chest: Effort normal and breath sounds normal. No respiratory distress. He has no wheezes. He has no rales. Musculoskeletal:   Tenderness to palpation over the right sciatic notch. Positive right straight leg raise test. No midline L-spine tenderness. Strength 5/5 in bilateral lower extremities. 2+ dorsalis pedis pulses. Distal sensation intact.    Neurological: He is alert and oriented to person, place, and time. He has normal strength. No cranial nerve deficit or sensory deficit. GCS eye subscore is 4. GCS verbal subscore is 5. GCS motor subscore is 6. Skin: Skin is warm and dry. No rash noted. Psychiatric: He has a normal mood and affect. His behavior is normal.   Nursing note and vitals reviewed. Diagnostic Study Results     Labs -   No results found for this or any previous visit (from the past 12 hour(s)). Radiologic Studies -   No orders to display     CT Results  (Last 48 hours)    None        CXR Results  (Last 48 hours)    None            Medical Decision Making   I am the first provider for this patient. I reviewed the vital signs, available nursing notes, past medical history, past surgical history, family history and social history. Vital Signs-Reviewed the patient's vital signs. Patient Vitals for the past 12 hrs:   Temp Pulse Resp BP SpO2   11/17/19 1231 99 °F (37.2 °C) 90 20 158/86 99 %         Records Reviewed: Nursing Notes and Old Medical Records      Provider Notes (Medical Decision Making):   DDx: lumbar radiculopathy, muscle strain, degenerative disc disease, herniated disc    Patient has no red flag signs and neurological exam is intact. History and physical are consistent with lumbar radiculopathy. There is no indication for imaging today. Plan to treat with analgesia and muscle relaxers. I will also prescribe a prednisone taper but counseled him that this can cause his glucose to increase and he should only start taking this if pain is not improving in 2 to 3 days. ED Course:   Initial assessment performed. The patients presenting problems have been discussed, and they are in agreement with the care plan formulated and outlined with them. I have encouraged them to ask questions as they arise throughout their visit. Disposition:  3:19 PM -    The patient has been re-evaluated and is ready for discharge.  Reviewed available results with patient. Counseled patient on diagnosis and care plan. Patient has expressed understanding, and all questions have been answered. Patient agrees with plan and agrees to follow up as recommended, or to return to the ED if their symptoms worsen. Discharge instructions have been provided and explained to the patient, along with reasons to return to the ED. PLAN:  1. Discharge Medication List as of 11/17/2019  3:19 PM      START taking these medications    Details   cyclobenzaprine (FLEXERIL) 10 mg tablet Take 1 Tab by mouth three (3) times daily as needed for Muscle Spasm(s). , Normal, Disp-20 Tab, R-0      ibuprofen (MOTRIN) 800 mg tablet Take 1 Tab by mouth every six (6) hours as needed for Pain for up to 7 days. , Normal, Disp-20 Tab, R-0      predniSONE (STERAPRED DS) 10 mg dose pack Follow instructions on taper pack., Normal, Disp-21 Tab, R-0         CONTINUE these medications which have NOT CHANGED    Details   insulin degludec (TRESIBA FLEXTOUCH U-100) 100 unit/mL (3 mL) inpn 35 Units by SubCUTAneous route daily. Indications: 35 unit, Normal, Disp-4 Pen, R-4      metFORMIN ER (GLUCOPHAGE XR) 500 mg tablet Take 2 Tabs by mouth two (2) times a day., Normal, Disp-120 Tab, R-4      semaglutide (OZEMPIC) 0.25 mg/0.2 mL (2 mg/1.5 mL) sub-q pen 0.25 mg by SubCUTAneous route every seven (7) days. , Normal, Disp-1 Box, R-1      sildenafil, antihypertensive, (REVATIO) 20 mg tablet Take 1 Tab by mouth daily as needed (erectile dysfunction). , Normal, Disp-30 Tab, R-3           2.    Follow-up Information     Follow up With Specialties Details Why Contact Info    Kang Mendoza MD Internal Medicine Schedule an appointment as soon as possible for a visit in 1 week for a recheck [address] [place] [address]  [phone]      [facility] EMERGENCY DEPT Emergency Medicine Go to If symptoms worsen [address] [place] [address] [phone] Return to ED if worse     Diagnosis     Clinical Impression:   1. Acute right-sided low back pain with right-sided sciatica            Lucrecia Self.  CARLOS Murrell

## 2019-11-18 DIAGNOSIS — N52.9 ERECTILE DYSFUNCTION, UNSPECIFIED ERECTILE DYSFUNCTION TYPE: ICD-10-CM

## 2019-11-19 RX ORDER — SILDENAFIL CITRATE 20 MG/1
20 TABLET ORAL
Qty: 30 TAB | Refills: 3 | Status: SHIPPED | OUTPATIENT
Start: 2019-11-19 | End: 2019-11-22 | Stop reason: SDUPTHER

## 2019-11-22 DIAGNOSIS — N52.9 ERECTILE DYSFUNCTION, UNSPECIFIED ERECTILE DYSFUNCTION TYPE: ICD-10-CM

## 2019-11-22 RX ORDER — SILDENAFIL CITRATE 20 MG/1
20 TABLET ORAL
Qty: 30 TAB | Refills: 3 | Status: SHIPPED | OUTPATIENT
Start: 2019-11-22 | End: 2020-07-13 | Stop reason: SDUPTHER

## 2019-11-22 NOTE — TELEPHONE ENCOUNTER
Dr. Carol Lott   Received: Today   Message Contents   Ted Hernandez, [facility]   Phone Number: [phone]             Caller (if not patient): self   Relationship of caller (if not patient): N/A   Best contact number(s): [phone]   Medication name and dosage if known: Sildenafil 20 mg   Is patient out of this medication (yes/no): yes   Pharmacy name: [facility]   Pharmacy listed in chart? (yes/no):yes   Pharmacy phone number: on file   Date of last visit: Friday, August 30, 2019 09:15 AM   Details to clarify the Request: Pt is requesting a refill for the medication listed above.

## 2019-11-29 ENCOUNTER — TELEPHONE (OUTPATIENT)
Dept: INTERNAL MEDICINE CLINIC | Age: 34
End: 2019-11-29

## 2019-12-05 ENCOUNTER — OFFICE VISIT (OUTPATIENT)
Dept: INTERNAL MEDICINE CLINIC | Age: 34
End: 2019-12-05

## 2019-12-05 VITALS
TEMPERATURE: 97.8 F | HEIGHT: 68 IN | BODY MASS INDEX: 47.74 KG/M2 | DIASTOLIC BLOOD PRESSURE: 83 MMHG | RESPIRATION RATE: 18 BRPM | WEIGHT: 315 LBS | SYSTOLIC BLOOD PRESSURE: 137 MMHG | OXYGEN SATURATION: 99 % | HEART RATE: 98 BPM

## 2019-12-05 DIAGNOSIS — F41.9 ANXIETY: ICD-10-CM

## 2019-12-05 DIAGNOSIS — Z23 ENCOUNTER FOR IMMUNIZATION: ICD-10-CM

## 2019-12-05 DIAGNOSIS — E78.00 HIGH CHOLESTEROL: ICD-10-CM

## 2019-12-05 DIAGNOSIS — E11.65 UNCONTROLLED TYPE 2 DIABETES MELLITUS WITH HYPERGLYCEMIA (HCC): Primary | ICD-10-CM

## 2019-12-05 RX ORDER — SERTRALINE HYDROCHLORIDE 25 MG/1
25 TABLET, FILM COATED ORAL DAILY
Qty: 30 TAB | Refills: 1 | Status: SHIPPED | OUTPATIENT
Start: 2019-12-05 | End: 2020-01-02 | Stop reason: SDUPTHER

## 2019-12-05 RX ORDER — ATORVASTATIN CALCIUM 20 MG/1
20 TABLET, FILM COATED ORAL
Qty: 90 TAB | Refills: 3 | Status: SHIPPED | OUTPATIENT
Start: 2019-12-05 | End: 2020-06-18

## 2019-12-05 NOTE — PROGRESS NOTES
Reviewed record in preparation for visit and have obtained necessary documentation. Identified pt with two pt identifiers(name and ). Chief Complaint   Patient presents with   [facility] Diabetes       Health Maintenance Due   Topic Date Due    Eye Exam  1993    DTaP/Tdap/Td  (1 - Tdap) 1994    Flu Vaccine  2019       Mr. Gloria Doty has a reminder for a \"due or due soon\" health maintenance. I have asked that he discuss this further with his primary care provider for follow-up on this health maintenance. Coordination of Care Questionnaire:  :     1) Have you been to an emergency room, urgent care clinic since your last visit? no   Hospitalized since your last visit? no             2) Have you seen or consulted any other health care providers outside of [address] [place] [address] since your last visit? no  (Include any pap smears or colon screenings in this section.)    3) In the event something were to happen to you and you were unable to speak on your behalf, do you have an Advance Directive/ Living Will in place stating your wishes? NO    Do you have an Advance Directive on file? no    4) Are you interested in receiving information on Advance Directives? NO    Patient is accompanied by self I have received verbal consent from Candy Bermudez to discuss any/all medical information while they are present in the room.

## 2019-12-05 NOTE — PATIENT INSTRUCTIONS
Lets stop the insulin Increase the ozempic dose to 0.5mg  
Start half a pill of zoloft ( 12.5mg at dinner time) After two weeks if doing well start cholesterol medicine Follow up in one month

## 2019-12-05 NOTE — PROGRESS NOTES
Mr. Ruth Ring is presenting to follow up     CC:  Diabetes       HPI:  Recall patient presented to [facility] care on 07/30   With uncontrolled diabetes. Patient was on Tresiba 24 units and then we gradually increased to 35. We started Ozempic a few months ago and he has had great glycemic control. He is also taking metformin 1000 mg twice a day unfortunately [facility]  Blood sugar average is 130s-150s off of tresiba    Has made significant changes in his diet, he is eating less carbs less sugar. Patient feels more emotional for a long time. Has tried hard to control it. \" Just me and my dog\" mother has given patient lorazepam 0.5mg daily. Has irritability, mood changes. Has insomnia    Reviewed social hx and no changes    Reviewed family hx     Review of systems:  10 systems reviewed and negative other than HPI      Past Medical History:   Diagnosis Date    Erectile dysfunction     Type 2 diabetes mellitus ([facility] [address]         History reviewed. No pertinent surgical history. No Known Allergies    Current Outpatient Medications on File Prior to Visit   Medication Sig Dispense Refill    sildenafil, antihypertensive, (REVATIO) 20 mg tablet Take 1 Tab by mouth daily as needed (erectile dysfunction). 30 Tab 3    metFORMIN ER (GLUCOPHAGE XR) 500 mg tablet Take 2 Tabs by mouth two (2) times a day. 120 Tab 4    semaglutide (OZEMPIC) 0.25 mg/0.2 mL (2 mg/1.5 mL) sub-q pen 0.25 mg by SubCUTAneous route every seven (7) days. 1 Box 1    insulin degludec (TRESIBA FLEXTOUCH U-100) 100 unit/mL (3 mL) inpn 35 Units by SubCUTAneous route daily. Indications: 35 unit 4 Pen 4     No current facility-administered medications on file prior to visit. family history includes Diabetes in his maternal grandfather, maternal grandmother, paternal grandfather, and paternal grandmother.     Social History     Socioeconomic History    Marital status:      Spouse name: Not on file    Number of children: Not on file    Years of education: Not on file    Highest education level: Not on file   Occupational History    Not on file   Social Needs    Financial resource strain: Not on file    Food insecurity:     Worry: Not on file     Inability: Not on file    Transportation needs:     Medical: Not on file     Non-medical: Not on file   Tobacco Use    Smoking status: Never Smoker    Smokeless tobacco: Never Used   Substance and Sexual Activity    Alcohol use: Yes    Drug use: Never    Sexual activity: Yes     Partners: Female   Lifestyle    Physical activity:     Days per week: Not on file     Minutes per session: Not on file    Stress: Not on file   Relationships    Social connections:     Talks on phone: Not on file     Gets together: Not on file     Attends Alevism service: Not on file     Active member of club or organization: Not on file     Attends meetings of clubs or organizations: Not on file     Relationship status: Not on file    Intimate partner violence:     Fear of current or ex partner: Not on file     Emotionally abused: Not on file     Physically abused: Not on file     Forced sexual activity: Not on file   Other Topics Concern    Not on file   Social History Narrative    Not on file       Visit Vitals  /83 (BP 1 Location: Right arm, BP Patient Position: Sitting)   Pulse 98   Temp 97.8 °F (36.6 °C) (Oral)   Resp 18   Ht 5' 8\" (1.727 m)   Wt 315 lb (142.9 kg)   SpO2 99%   BMI 47.90 kg/m²     General:  Well appearing male no acute distress  Respiratory: no respiratory distress,  no wheezing, no rhonchi, no rales. No chest wall tenderness. Cardiovascular:  RRR, normal S1S2, no murmur. Gastrointestinal: normal bowel sounds, soft, nontender, without masses. No hepatosplenomegaly. Extremities +2 pulses, no edema, normal sensation   Skin:  No rash, no lesions, no ulcers. Skin warm, normal turgor, without induration or nodules.   Psych:  Normal affect, anxious affect      Lab Results   Component Value Date/Time    WBC 6.7 07/17/2019 11:42 AM    HGB 15.3 07/17/2019 11:42 AM    HCT 45.7 07/17/2019 11:42 AM    PLATELET 504 92/86/7579 11:42 AM    MCV 88.9 07/17/2019 11:42 AM     Lab Results   Component Value Date/Time    Sodium 141 08/30/2019 10:04 AM    Potassium 4.8 08/30/2019 10:04 AM    Chloride 100 08/30/2019 10:04 AM    CO2 23 08/30/2019 10:04 AM    Anion gap 6 07/17/2019 11:42 AM    Glucose 94 08/30/2019 10:04 AM    BUN 15 08/30/2019 10:04 AM    Creatinine 1.06 08/30/2019 10:04 AM    BUN/Creatinine ratio 14 08/30/2019 10:04 AM    GFR est  08/30/2019 10:04 AM    GFR est non-AA 90 08/30/2019 10:04 AM    Calcium 9.7 08/30/2019 10:04 AM     Lab Results   Component Value Date/Time    Cholesterol, total 168 08/30/2019 10:04 AM    HDL Cholesterol 35 (L) 08/30/2019 10:04 AM    LDL, calculated 109 (H) 08/30/2019 10:04 AM    VLDL, calculated 24 08/30/2019 10:04 AM    Triglyceride 118 08/30/2019 10:04 AM     No results found for: TSH, TSH2, TSH3, TSHP, TSHEXT, TSHEXT  Lab Results   Component Value Date/Time    Hemoglobin A1c 8.1 (H) 08/30/2019 10:04 AM     No results found for: [place]clair Flor, XQVID3, XQVID, VD3RIA                Assessment and Plan:     38 yo male with a hx of diabetes type 2, ED and obesity presenting to follow up       Type 2 diabetes: making progress. stop the tresiba and increase the ozempic to .5mg weekly. Continue the metformin  - discussed dietary changes ( less carbs)  - follow up in 1 month   - continue daily fasting blood sugar monitoring ( he is doing a good job monitoring)  - start lipitor        Mild elevation in liver enzymes: I suspect this is due to fatty liver, asymptomatic.  Check today    High cholesterol: starting lipitor 20mg at bedtime    Anxiety: ongoing, with increased emotional lability, no SI start low dose sertraline    Orders Placed This Encounter    ME IMMUNIZ ADMIN,1 SINGLE/COMB VAC/TOXOID    Influenza virus vaccine (QUADRIVALENT PRES FREE SYRINGE) IM ([id number])    METABOLIC PANEL, COMPREHENSIVE    HEMOGLOBIN A1C W/O EAG    TSH AND FREE T4    semaglutide (OZEMPIC) 1 mg/dose (2 mg/1.5 mL) sub-q pen     Si.5 mg by SubCUTAneous route every seven (7) days. Dispense:  1 Box     Refill:  3    atorvastatin (LIPITOR) 20 mg tablet     Sig: Take 1 Tab by mouth nightly. Dispense:  90 Tab     Refill:  3    sertraline (ZOLOFT) 25 mg tablet     Sig: Take 1 Tab by mouth daily.      Dispense:  30 Tab     Refill:  1       In addition flu shot was administered    Follow up in 1 month    Sona Calderon MD

## 2019-12-06 LAB
ALBUMIN SERPL-MCNC: 4.4 G/DL (ref 3.5–5.5)
ALBUMIN/GLOB SERPL: 1.6 {RATIO} (ref 1.2–2.2)
ALP SERPL-CCNC: 100 IU/L (ref 39–117)
ALT SERPL-CCNC: 116 IU/L (ref 0–44)
AST SERPL-CCNC: 45 IU/L (ref 0–40)
BILIRUB SERPL-MCNC: 0.7 MG/DL (ref 0–1.2)
BUN SERPL-MCNC: 16 MG/DL (ref 6–20)
BUN/CREAT SERPL: 15 (ref 9–20)
CALCIUM SERPL-MCNC: 9.9 MG/DL (ref 8.7–10.2)
CHLORIDE SERPL-SCNC: 98 MMOL/L (ref 96–106)
CO2 SERPL-SCNC: 23 MMOL/L (ref 20–29)
CREAT SERPL-MCNC: 1.04 MG/DL (ref 0.76–1.27)
GLOBULIN SER CALC-MCNC: 2.7 G/DL (ref 1.5–4.5)
GLUCOSE SERPL-MCNC: 135 MG/DL (ref 65–99)
HBA1C MFR BLD: 6.4 % (ref 4.8–5.6)
POTASSIUM SERPL-SCNC: 4.9 MMOL/L (ref 3.5–5.2)
PROT SERPL-MCNC: 7.1 G/DL (ref 6–8.5)
SODIUM SERPL-SCNC: 138 MMOL/L (ref 134–144)
T4 FREE SERPL-MCNC: 1.38 NG/DL (ref 0.82–1.77)
TSH SERPL DL<=0.005 MIU/L-ACNC: 1.98 UIU/ML (ref 0.45–4.5)

## 2019-12-06 NOTE — PROGRESS NOTES
Normal kidney function   Mild liver enzyme elevation.  Hold off on starting cholesterol mediction ( lipitor) given liver enzyme elevation  Repeat at follow up   Diabetes control is excellent  Thyroid function is normal

## 2019-12-29 DIAGNOSIS — E11.65 UNCONTROLLED TYPE 2 DIABETES MELLITUS WITH HYPERGLYCEMIA (HCC): ICD-10-CM

## 2019-12-29 RX ORDER — METFORMIN HYDROCHLORIDE 500 MG/1
TABLET, EXTENDED RELEASE ORAL
Qty: 120 TAB | Refills: 4 | Status: SHIPPED | OUTPATIENT
Start: 2019-12-29 | End: 2020-01-15 | Stop reason: SDUPTHER

## 2020-01-02 ENCOUNTER — OFFICE VISIT (OUTPATIENT)
Dept: INTERNAL MEDICINE CLINIC | Age: 35
End: 2020-01-02

## 2020-01-02 VITALS
OXYGEN SATURATION: 99 % | DIASTOLIC BLOOD PRESSURE: 81 MMHG | RESPIRATION RATE: 18 BRPM | TEMPERATURE: 97.8 F | SYSTOLIC BLOOD PRESSURE: 128 MMHG | HEIGHT: 68 IN | HEART RATE: 100 BPM | WEIGHT: 315 LBS | BODY MASS INDEX: 47.74 KG/M2

## 2020-01-02 DIAGNOSIS — F41.9 ANXIETY: ICD-10-CM

## 2020-01-02 DIAGNOSIS — E11.65 UNCONTROLLED TYPE 2 DIABETES MELLITUS WITH HYPERGLYCEMIA (HCC): Primary | ICD-10-CM

## 2020-01-02 DIAGNOSIS — R74.8 ELEVATED LIVER ENZYMES: ICD-10-CM

## 2020-01-02 RX ORDER — SERTRALINE HYDROCHLORIDE 25 MG/1
25 TABLET, FILM COATED ORAL DAILY
Qty: 30 TAB | Refills: 5 | Status: SHIPPED | OUTPATIENT
Start: 2020-01-02 | End: 2020-07-20 | Stop reason: SDUPTHER

## 2020-01-02 NOTE — PROGRESS NOTES
Mr. Julius Nails is presenting to follow up     CC:  Diabetes       HPI:  Recall patient presented to [facility] on 07/30   With uncontrolled diabetes. Patient was on Tresiba 24 units and then we gradually increased to 35. We started Ozempic a few months ago and he has had great glycemic control. He is also taking metformin 1000 mg twice a day unfortunately he cannot afford the [facility]  Blood sugar average is 130s-150s off of tresiba  Unfortunately the price on ozempic became very high and currently off of ozempic  He is only on metformin - BS average is 135    Has made significant changes in his diet, he is eating less carbs less sugar. He voiced anxiety and depression for a long time and previous visit started on zoloft 25mg. He feels his mood is much better , feels more balanced \" not down in the dumps\" No medication side effects. Reviewed social hx and no changes      Also started on lipitor for high cholesterol and tolerating medication well  Reviewed family hx     Review of systems:  10 systems reviewed and negative other than HPI      Past Medical History:   Diagnosis Date    Erectile dysfunction     Type 2 diabetes mellitus ([facility] [address]         History reviewed. No pertinent surgical history. No Known Allergies    Current Outpatient Medications on File Prior to Visit   Medication Sig Dispense Refill    metFORMIN ER (GLUCOPHAGE XR) 500 mg tablet TAKE 2 TABLETS BY MOUTH TWICE DAILY 120 Tab 4    atorvastatin (LIPITOR) 20 mg tablet Take 1 Tab by mouth nightly. 90 Tab 3    sertraline (ZOLOFT) 25 mg tablet Take 1 Tab by mouth daily. 30 Tab 1    sildenafil, antihypertensive, (REVATIO) 20 mg tablet Take 1 Tab by mouth daily as needed (erectile dysfunction). 30 Tab 3    semaglutide (OZEMPIC) 1 mg/dose (2 mg/1.5 mL) sub-q pen 0.5 mg by SubCUTAneous route every seven (7) days. 1 Box 3     No current facility-administered medications on file prior to visit.         family history includes Diabetes in his maternal grandfather, maternal grandmother, paternal grandfather, and paternal grandmother. Social History     Socioeconomic History    Marital status:      Spouse name: Not on file    Number of children: Not on file    Years of education: Not on file    Highest education level: Not on file   Occupational History    Not on file   Social Needs    Financial resource strain: Not on file    Food insecurity:     Worry: Not on file     Inability: Not on file    Transportation needs:     Medical: Not on file     Non-medical: Not on file   Tobacco Use    Smoking status: Never Smoker    Smokeless tobacco: Never Used   Substance and Sexual Activity    Alcohol use: Yes    Drug use: Never    Sexual activity: Yes     Partners: Female   Lifestyle    Physical activity:     Days per week: Not on file     Minutes per session: Not on file    Stress: Not on file   Relationships    Social connections:     Talks on phone: Not on file     Gets together: Not on file     Attends Orthodox service: Not on file     Active member of club or organization: Not on file     Attends meetings of clubs or organizations: Not on file     Relationship status: Not on file    Intimate partner violence:     Fear of current or ex partner: Not on file     Emotionally abused: Not on file     Physically abused: Not on file     Forced sexual activity: Not on file   Other Topics Concern    Not on file   Social History Narrative    Not on file       Visit Vitals  /81 (BP 1 Location: Right arm, BP Patient Position: Sitting)   Pulse 100   Temp 97.8 °F (36.6 °C) (Oral)   Resp 18   Ht 5' 8\" (1.727 m)   Wt 318 lb (144.2 kg)   SpO2 99%   BMI 48.35 kg/m²     General:  Well appearing male no acute distress  Respiratory: no respiratory distress,  no wheezing, no rhonchi, no rales. No chest wall tenderness. Cardiovascular:  RRR, normal S1S2, no murmur. Gastrointestinal: normal bowel sounds, soft, nontender, without masses.   No hepatosplenomegaly. Extremities +2 pulses, no edema, normal sensation   Skin:  No rash, no lesions, no ulcers. Skin warm, normal turgor, without induration or nodules. Psych:  Normal affect, anxious affect      Lab Results   Component Value Date/Time    WBC 6.7 07/17/2019 11:42 AM    HGB 15.3 07/17/2019 11:42 AM    HCT 45.7 07/17/2019 11:42 AM    PLATELET 317 03/88/3508 11:42 AM    MCV 88.9 07/17/2019 11:42 AM     Lab Results   Component Value Date/Time    Sodium 138 12/05/2019 09:56 AM    Potassium 4.9 12/05/2019 09:56 AM    Chloride 98 12/05/2019 09:56 AM    CO2 23 12/05/2019 09:56 AM    Anion gap 6 07/17/2019 11:42 AM    Glucose 135 (H) 12/05/2019 09:56 AM    BUN 16 12/05/2019 09:56 AM    Creatinine 1.04 12/05/2019 09:56 AM    BUN/Creatinine ratio 15 12/05/2019 09:56 AM    GFR est  12/05/2019 09:56 AM    GFR est non-AA 92 12/05/2019 09:56 AM    Calcium 9.9 12/05/2019 09:56 AM     Lab Results   Component Value Date/Time    Cholesterol, total 168 08/30/2019 10:04 AM    HDL Cholesterol 35 (L) 08/30/2019 10:04 AM    LDL, calculated 109 (H) 08/30/2019 10:04 AM    VLDL, calculated 24 08/30/2019 10:04 AM    Triglyceride 118 08/30/2019 10:04 AM     Lab Results   Component Value Date/Time    TSH 1.980 12/05/2019 09:56 AM     Lab Results   Component Value Date/Time    Hemoglobin A1c 6.4 (H) 12/05/2019 09:56 AM     No results found for: Amy Rosado, XQVID3, XQVID, VD3RIA                Assessment and Plan:     36 yo male with a hx of diabetes type 2, ED and obesity presenting to follow up       Type 2 diabetes: making progress. Stopped tresiba and now had to stop ozempic both with costs that are not affordable.  Currently on metformin and BS in the 130-150 range   Continue the metformin  - discussed dietary changes ( less carbs)  - prescribed bydureon with coupon and hopefully cost will be better  - continue daily fasting blood sugar monitoring ( he is doing a good job monitoring)  - on lipitor since Dec 2019 Mild elevation in liver enzymes: I suspect this is due to fatty liver, asymptomatic. Recheck today since started lipitor and liver US ordered    High cholesterol: on lipitor     Anxiety: much better on zoloft 25mg daily     Orders Placed This Encounter    US ABD LTD     Standing Status:   Future     Standing Expiration Date:   2021     Order Specific Question:   Reason for Exam     Answer:   liver enzyme elevation     Order Specific Question:   Specific Body Part     Answer:   liver    METABOLIC PANEL, COMPREHENSIVE    exenatide microspheres (BYDUREON BCISE) 2 mg/0.85 mL atIn     Si Each by SubCUTAneous route every seven (7) days. Dispense:  4 Syringe     Refill:  4    sertraline (ZOLOFT) 25 mg tablet     Sig: Take 1 Tab by mouth daily. Dispense:  30 Tab     Refill:  5         No orders of the defined types were placed in this encounter.       In addition flu shot was administered    Follow up in 1 month    Tiffanie Pagan MD

## 2020-01-02 NOTE — PROGRESS NOTES
Reviewed record in preparation for visit and have obtained necessary documentation. Identified pt with two pt identifiers(name and ). Chief Complaint   Patient presents with   [facility] Diabetes       Health Maintenance Due   Topic Date Due    Eye Exam  1993    DTaP/Tdap/Td  (1 - Tdap) 1994       Mr. Lahoma Meckel has a reminder for a \"due or due soon\" health maintenance. I have asked that he discuss this further with his primary care provider for follow-up on this health maintenance. Coordination of Care Questionnaire:  :     1) Have you been to an emergency room, urgent care clinic since your last visit? no   Hospitalized since your last visit? no             2) Have you seen or consulted any other health care providers outside of [address] [place] [address] since your last visit? no  (Include any pap smears or colon screenings in this section.)    3) In the event something were to happen to you and you were unable to speak on your behalf, do you have an Advance Directive/ Living Will in place stating your wishes? NO    Do you have an Advance Directive on file? no    4) Are you interested in receiving information on Advance Directives? NO    Patient is accompanied by self I have received verbal consent from Katie Davenport to discuss any/all medical information while they are present in the room.

## 2020-01-02 NOTE — PATIENT INSTRUCTIONS
Office Policies    Phone calls/patient messages:            Please allow up to 24 hours for someone in the office to contact you about your call or message. Be mindful your provider may be out of the office or your message may require further review. We encourage you to use [employer] for your messages as this is a faster, more efficient way to communicate with our office                         Medication Refills:            Prescription medications require 48-72 business hours to process. We encourage you to use [employer] for your refills. For controlled medications: Please allow 72 business hours to process. Certain medications may require you to  a written prescription at our office. NO narcotic/controlled medications will be prescribed after 4pm Monday through Friday or on weekends              Form/Paperwork Completion:            Please note a $25 fee may incur for all paperwork for completed by our providers. We ask that you allow 7-10 business days. Pre-payment is due prior to picking up/faxing the completed form. You may also download your forms to [employer] to have your doctor print off.

## 2020-01-03 LAB
ALBUMIN SERPL-MCNC: 4.3 G/DL (ref 3.5–5.5)
ALBUMIN/GLOB SERPL: 1.5 {RATIO} (ref 1.2–2.2)
ALP SERPL-CCNC: 109 IU/L (ref 39–117)
ALT SERPL-CCNC: 90 IU/L (ref 0–44)
AST SERPL-CCNC: 45 IU/L (ref 0–40)
BILIRUB SERPL-MCNC: 0.5 MG/DL (ref 0–1.2)
BUN SERPL-MCNC: 15 MG/DL (ref 6–20)
BUN/CREAT SERPL: 16 (ref 9–20)
CALCIUM SERPL-MCNC: 9.6 MG/DL (ref 8.7–10.2)
CHLORIDE SERPL-SCNC: 104 MMOL/L (ref 96–106)
CO2 SERPL-SCNC: 21 MMOL/L (ref 20–29)
CREAT SERPL-MCNC: 0.96 MG/DL (ref 0.76–1.27)
GLOBULIN SER CALC-MCNC: 2.9 G/DL (ref 1.5–4.5)
GLUCOSE SERPL-MCNC: 138 MG/DL (ref 65–99)
POTASSIUM SERPL-SCNC: 4.9 MMOL/L (ref 3.5–5.2)
PROT SERPL-MCNC: 7.2 G/DL (ref 6–8.5)
SODIUM SERPL-SCNC: 141 MMOL/L (ref 134–144)

## 2020-01-03 NOTE — PROGRESS NOTES
Normal kidney function  The liver enzymes are slightly better   We will repeat at your follow up  Were you able to obtain the bydureon at a good price?

## 2020-01-07 NOTE — PROGRESS NOTES
Spoke with patient  Two pt identifiers confirmed. Patient advised per Dr. Andrew Tobias of his recent lab results  Patient states that he has not checked on the bydureon yet  Advised patient to call the office if he has any issues. Pt verbalized understanding of information discussed w/ no further questions at this time.

## 2020-01-15 ENCOUNTER — HOSPITAL ENCOUNTER (OUTPATIENT)
Dept: ULTRASOUND IMAGING | Age: 35
Discharge: HOME | End: 2020-01-15
Attending: INTERNAL MEDICINE
Payer: COMMERCIAL

## 2020-01-15 DIAGNOSIS — E11.65 UNCONTROLLED TYPE 2 DIABETES MELLITUS WITH HYPERGLYCEMIA (HCC): ICD-10-CM

## 2020-01-15 PROCEDURE — 76705 ECHO EXAM OF ABDOMEN: CPT

## 2020-06-11 ENCOUNTER — VIRTUAL VISIT (OUTPATIENT)
Dept: INTERNAL MEDICINE CLINIC | Age: 35
End: 2020-06-11

## 2020-06-11 DIAGNOSIS — E11.65 UNCONTROLLED TYPE 2 DIABETES MELLITUS WITH HYPERGLYCEMIA (HCC): Primary | ICD-10-CM

## 2020-06-11 RX ORDER — INSULIN DEGLUDEC INJECTION 100 U/ML
35 INJECTION, SOLUTION SUBCUTANEOUS DAILY
Qty: 4 PEN | Refills: 5 | Status: SHIPPED | OUTPATIENT
Start: 2020-06-11 | End: 2020-07-24

## 2020-06-11 NOTE — PROGRESS NOTES
CC: Diabetes      HPI:    He is a 38 y.o. male who presents for evaluation of diabetes         Type 2 diabetes: unfortunately reports that for the past month blood sguar are in the 300 range or higher. Currently on metformin 1000mg BID only  Denies eating more sugar   Denies vision changes and neuropathy  - prescribed bydureon in the past and unable to obtain   - on lipitor since Dec 2019     High cholesterol: on lipitor   Tolerating it well        This is an established visit conducted via telemedicine with video. The patient has been instructed that this meets HIPAA criteria and acknowledges and agrees to this method of visitation. Pursuant to the emergency declaration under the [facility] [place] [address] waiver authority and the [employer] and [employer] Act, this Virtual Visit was conducted, with patient's consent, to reduce the patient's risk of exposure to COVID-19 and provide continuity of care for an established patient. Services were provided through a video synchronous discussion virtually to substitute for in-person clinic visit. ROS:  Constitutional: negative for fevers, chills, anorexia and weight loss  Eyes:   negative for visual disturbance,  irritation  ENT:   negative for tinnitus,sore throat,nasal congestion,ear pain, sinus pain.    Respiratory:  negative for cough, hemoptysis, dyspnea,wheezing  CV:   negative for chest pain, palpitations, lower extremity edema  GI:   negative for nausea, vomiting, diarrhea, abdominal pain,melena  Genitourinary: negative for frequency, dysuria, hematuria  Musculoskel: negative for myalgias, arthralgias, back pain, muscle weakness, joint pain  Neurological:  negative for headaches, dizziness, focal weakness, numbness  Psych:             Negative for depression and anxiety    Past Medical History:   Diagnosis Date    Erectile dysfunction     Type 2 diabetes mellitus ([facility] [address]        Current Outpatient Medications on File Prior to Visit   Medication Sig Dispense Refill    metFORMIN ER (GLUCOPHAGE XR) 500 mg tablet Take 2 Tabs by mouth two (2) times daily (after meals). 120 Tab 5    exenatide microspheres (BYDUREON BCISE) 2 mg/0.85 mL atIn 1 Each by SubCUTAneous route every seven (7) days. 4 Syringe 4    sertraline (ZOLOFT) 25 mg tablet Take 1 Tab by mouth daily. 30 Tab 5    atorvastatin (LIPITOR) 20 mg tablet Take 1 Tab by mouth nightly. 90 Tab 3    sildenafil, antihypertensive, (REVATIO) 20 mg tablet Take 1 Tab by mouth daily as needed (erectile dysfunction). 30 Tab 3     No current facility-administered medications on file prior to visit. No past surgical history on file. Family History   Problem Relation Age of Onset    Diabetes Maternal Grandmother     Diabetes Maternal Grandfather     Diabetes Paternal Grandmother     Diabetes Paternal Grandfather      Reviewed and no changes     Social History     Socioeconomic History    Marital status:      Spouse name: Not on file    Number of children: Not on file    Years of education: Not on file    Highest education level: Not on file   Occupational History    Not on file   Social Needs    Financial resource strain: Not on file    Food insecurity     Worry: Not on file     Inability: Not on file    Transportation needs     Medical: Not on file     Non-medical: Not on file   Tobacco Use    Smoking status: Never Smoker    Smokeless tobacco: Never Used   Substance and Sexual Activity    Alcohol use:  Yes    Drug use: Never    Sexual activity: Yes     Partners: Female   Lifestyle    Physical activity     Days per week: Not on file     Minutes per session: Not on file    Stress: Not on file   Relationships    Social connections     Talks on phone: Not on file     Gets together: Not on file     Attends Judaism service: Not on file     Active member of club or organization: Not on file     Attends meetings of clubs or organizations: Not on file     Relationship status: Not on file    Intimate partner violence     Fear of current or ex partner: Not on file     Emotionally abused: Not on file     Physically abused: Not on file     Forced sexual activity: Not on file   Other Topics Concern    Not on file   Social History Narrative    Not on file          There were no vitals taken for this visit. Physical Examination:   Gen: well appearing male  HEENT: normal conjunctiva, no audible congestion, patient does not see oral erythema, has MMM  Neck: patient does not feel enlarged or tender LAD or masses  Resp: normal respiratory effort, no audible wheezing. CV: patient does not feel palpitations or heart irregularity  Abd: patient does not feel abdominal tenderness or mass, patient does not notice distension  Extrem: patient does not see swelling in ankles or joints.    Neuro: Alert and oriented, able to answer questions without difficulty, able to move all extremities and walk normally          Lab Results   Component Value Date/Time    WBC 6.7 07/17/2019 11:42 AM    HGB 15.3 07/17/2019 11:42 AM    HCT 45.7 07/17/2019 11:42 AM    PLATELET 647 39/55/0084 11:42 AM    MCV 88.9 07/17/2019 11:42 AM     Lab Results   Component Value Date/Time    Sodium 141 01/02/2020 10:33 AM    Potassium 4.9 01/02/2020 10:33 AM    Chloride 104 01/02/2020 10:33 AM    CO2 21 01/02/2020 10:33 AM    Anion gap 6 07/17/2019 11:42 AM    Glucose 138 (H) 01/02/2020 10:33 AM    BUN 15 01/02/2020 10:33 AM    Creatinine 0.96 01/02/2020 10:33 AM    BUN/Creatinine ratio 16 01/02/2020 10:33 AM    GFR est  01/02/2020 10:33 AM    GFR est non- 01/02/2020 10:33 AM    Calcium 9.6 01/02/2020 10:33 AM     Lab Results   Component Value Date/Time    Cholesterol, total 168 08/30/2019 10:04 AM    HDL Cholesterol 35 (L) 08/30/2019 10:04 AM    LDL, calculated 109 (H) 08/30/2019 10:04 AM    VLDL, calculated 24 08/30/2019 10:04 AM    Triglyceride 118 08/30/2019 10:04 AM     Lab Results   Component Value Date/Time    TSH 1.980 12/05/2019 09:56 AM     No results found for: PSA, Shelli Sheets, PSAR3, [id number], [id number], PSALT  Lab Results   Component Value Date/Time    Hemoglobin A1c 6.4 (H) 12/05/2019 09:56 AM     No results found for: VITD3, XQVID2, XQVID3, Levern Augustine, VD3RIA    Lab Results   Component Value Date/Time    ALT (SGPT) 90 (H) 01/02/2020 10:33 AM    Alk. phosphatase 109 01/02/2020 10:33 AM    Bilirubin, total 0.5 01/02/2020 10:33 AM           Assessment/Plan:    1. Uncontrolled type 2 diabetes mellitus with hyperglycemia ([facility] [address]  -continue metformin  Patient will start tresiba 35 units daily   Start ozempic ( hopefully covered)   Monitor blood sugar and titrate up every 4 days by 2 units if BP is able 200   - insulin degludec Shardianna Bristle FlexTouch U-100) 100 unit/mL (3 mL) inpn; 35 Units by SubCUTAneous route daily. Dispense: 4 Pen; Refill: 5  - semaglutide (OZEMPIC) 0.25 mg/0.2 mL (2 mg/1.5 mL) sub-q pen; 0.25 mg by SubCUTAneous route every seven (7) days. Dispense: 1 Box; Refill: 2      Follow up in 4 weeks       Eleazar Chappell MD    This is an established visit conducted via real time video and audio telemedicine. The patient has been instructed that this meets HIPAA criteria and acknowledges and agrees to this method of visitation.

## 2020-06-11 NOTE — PROGRESS NOTES
Reviewed record in preparation for visit and have obtained necessary documentation. Identified pt with two pt identifiers(name and ). Chief Complaint   Patient presents with   Ana Walter Diabetes       Health Maintenance Due   Topic Date Due    Eye Exam  1993    DTaP/Tdap/Td  (1 - Tdap) 2004       Mr. La Phillips has a reminder for a \"due or due soon\" health maintenance. I have asked that he discuss this further with his primary care provider for follow-up on this health maintenance. Coordination of Care Questionnaire:  :     1) Have you been to an emergency room, urgent care clinic since your last visit? no   Hospitalized since your last visit? no             2) Have you seen or consulted any other health care providers outside of [address] [place] [address] since your last visit? no  (Include any pap smears or colon screenings in this section.)    3) In the event something were to happen to you and you were unable to speak on your behalf, do you have an Advance Directive/ Living Will in place stating your wishes? NO    Do you have an Advance Directive on file? no    4) Are you interested in receiving information on Advance Directives? NO    Patient is accompanied by self I have received verbal consent from Nahed Billings to discuss any/all medical information while they are present in the room.

## 2020-06-12 ENCOUNTER — TELEPHONE (OUTPATIENT)
Dept: INTERNAL MEDICINE CLINIC | Age: 35
End: 2020-06-12

## 2020-06-12 NOTE — TELEPHONE ENCOUNTER
Prior authorization attempted for patients Tresiba. Message for insurance company coming back as PA has been resolved, no additional PA is required.   Message left advising [facility] pharmacy

## 2020-06-16 ENCOUNTER — PATIENT MESSAGE (OUTPATIENT)
Dept: INTERNAL MEDICINE CLINIC | Age: 35
End: 2020-06-16

## 2020-06-16 RX ORDER — CEPHALEXIN 500 MG/1
500 CAPSULE ORAL 4 TIMES DAILY
Qty: 28 CAP | Refills: 0 | Status: SHIPPED | OUTPATIENT
Start: 2020-06-16 | End: 2020-08-27 | Stop reason: ALTCHOICE

## 2020-06-16 NOTE — TELEPHONE ENCOUNTER
Alfred Alfred 6/16/2020 9:46 AM EDT      ----- Message -----  From: Castro Stevenson  Sent: 6/16/2020 9:01 AM EDT  To: Lopez Mitchell Pool  Subject: Visit Follow-Up Question     Hello,     I have had an issue for 3 weeks now that I hoped would go away, but now think I may need a script or something to take it away. My foreskin has been tight, red and I have a few cuts on it. I have tried neosporin but that doesn not help. Is there anything I can buy to get better.

## 2020-06-18 RX ORDER — SIMVASTATIN 40 MG/1
40 TABLET, FILM COATED ORAL
Qty: 90 TAB | Refills: 2 | Status: SHIPPED | OUTPATIENT
Start: 2020-06-18 | End: 2021-03-13

## 2020-06-18 NOTE — TELEPHONE ENCOUNTER
PCP: Jodie Ventura MD    Last appt: 6/11/2020  No future appointments. Requested Prescriptions     Pending Prescriptions Disp Refills    simvastatin (ZOCOR) 40 mg tablet 90 Tab 2     Sig: Take 1 Tab by mouth nightly.

## 2020-07-11 DIAGNOSIS — N52.9 ERECTILE DYSFUNCTION, UNSPECIFIED ERECTILE DYSFUNCTION TYPE: ICD-10-CM

## 2020-07-11 DIAGNOSIS — E11.65 UNCONTROLLED TYPE 2 DIABETES MELLITUS WITH HYPERGLYCEMIA (HCC): ICD-10-CM

## 2020-07-13 RX ORDER — METFORMIN HYDROCHLORIDE 500 MG/1
1000 TABLET, EXTENDED RELEASE ORAL
Qty: 120 TAB | Refills: 5 | Status: SHIPPED | OUTPATIENT
Start: 2020-07-13 | End: 2020-09-08 | Stop reason: DRUGHIGH

## 2020-07-13 RX ORDER — SILDENAFIL CITRATE 20 MG/1
20 TABLET ORAL
Qty: 30 TAB | Refills: 3 | Status: SHIPPED | OUTPATIENT
Start: 2020-07-13 | End: 2021-01-22

## 2020-07-13 NOTE — TELEPHONE ENCOUNTER
PCP: Darcie Esparza MD    Last appt: 6/11/2020  No future appointments. Requested Prescriptions     Pending Prescriptions Disp Refills    sildenafiL, pulmonary hypertension, (REVATIO) 20 mg tablet 30 Tab 3     Sig: Take 1 Tab by mouth daily as needed (erectile dysfunction).

## 2020-07-13 NOTE — TELEPHONE ENCOUNTER
PCP: Jose Armando Albrecht MD    Last appt: 6/11/2020  No future appointments. Requested Prescriptions     Pending Prescriptions Disp Refills    metFORMIN ER (GLUCOPHAGE XR) 500 mg tablet 120 Tab 5     Sig: Take 2 Tabs by mouth two (2) times daily (after meals).

## 2020-07-15 ENCOUNTER — TELEPHONE (OUTPATIENT)
Dept: INTERNAL MEDICINE CLINIC | Age: 35
End: 2020-07-15

## 2020-07-15 NOTE — TELEPHONE ENCOUNTER
----- Message from Moni Antoine sent at 7/15/2020 12:08 PM EDT -----  Regarding: Dr. Yecenia Houston / telephone  Contact: [phone]  Caller's first and last name and relationship to patient (if not the patient): n/a  Best contact number: [phone]  Preferred date and time: Pt. will like VV in the month of July   Scheduled appointment date and time: n/a  Reason for appointment: F/up for DM  Details to clarify the request: No appts available with Dr. Gigi Goodwin

## 2020-07-15 NOTE — TELEPHONE ENCOUNTER
Called, spoke to pt  Received two pt identifiers  Pt offered and accepted virtual appt for 07/24/2020 @ 7727. Pt verbalizes understanding of the instructions and has no further questions at this time.

## 2020-07-20 DIAGNOSIS — F41.9 ANXIETY: ICD-10-CM

## 2020-07-20 RX ORDER — SERTRALINE HYDROCHLORIDE 25 MG/1
25 TABLET, FILM COATED ORAL DAILY
Qty: 30 TAB | Refills: 5 | Status: SHIPPED | OUTPATIENT
Start: 2020-07-20 | End: 2021-01-31

## 2020-07-20 NOTE — TELEPHONE ENCOUNTER
PCP: Marcy Chaney MD    Last appt: 6/11/2020  Future Appointments   Date Time Provider Damaris Carmen   7/24/2020  9:30 AM Appa Hugo Silvestre MD [facility]       Requested Prescriptions     Pending Prescriptions Disp Refills    sertraline (ZOLOFT) 25 mg tablet 30 Tab 5     Sig: Take 1 Tab by mouth daily.

## 2020-07-24 ENCOUNTER — VIRTUAL VISIT (OUTPATIENT)
Dept: INTERNAL MEDICINE CLINIC | Age: 35
End: 2020-07-24

## 2020-07-24 DIAGNOSIS — E11.65 UNCONTROLLED TYPE 2 DIABETES MELLITUS WITH HYPERGLYCEMIA (HCC): Primary | ICD-10-CM

## 2020-07-24 RX ORDER — INSULIN DEGLUDEC INJECTION 100 U/ML
46 INJECTION, SOLUTION SUBCUTANEOUS DAILY
Qty: 5 PEN | Refills: 0
Start: 2020-07-24 | End: 2020-07-24 | Stop reason: SDUPTHER

## 2020-07-24 RX ORDER — INSULIN DEGLUDEC INJECTION 100 U/ML
INJECTION, SOLUTION SUBCUTANEOUS
Qty: 2 PEN | Refills: 0 | Status: SHIPPED | COMMUNITY
Start: 2020-07-24 | End: 2020-08-11

## 2020-07-24 RX ORDER — INSULIN DEGLUDEC INJECTION 100 U/ML
46 INJECTION, SOLUTION SUBCUTANEOUS DAILY
Qty: 5 PEN | Refills: 5 | Status: SHIPPED | OUTPATIENT
Start: 2020-07-24 | End: 2020-08-11

## 2020-07-24 RX ORDER — GLIPIZIDE 5 MG/1
5 TABLET ORAL 2 TIMES DAILY WITH MEALS
Qty: 60 TAB | Refills: 2 | Status: SHIPPED | OUTPATIENT
Start: 2020-07-24 | End: 2020-10-13

## 2020-07-24 NOTE — PROGRESS NOTES
Pharmacy Progress Note     Consulted by Dr. Braden Aj to review Mr. Katelyn Monet 's insulin access. Will assist with sample for now.       Catherine Heck, PharmD, BCACP, CDE       CLINICAL PHARMACY CONSULT: MED RECONCILIATION/REVIEW ADDENDUM    For Pharmacy Admin Tracking Only    PHSO: PHSO Patient?: Yes  Total # of Interventions Recommended: Count: 1

## 2020-07-24 NOTE — PROGRESS NOTES
CC: Diabetes      HPI:    He is a 38 y.o. male who presents for evaluation of diabetes         Type 2 diabetes: 40 units of tresiba,  average  289 lbs loosing weight  ozempic not affordable  Last month we added insulin titrated up to 40 units better reads but still above 200 notes after meals BS rises to 300- 400  . Currently on metformin 1000mg BID only  Eating low carb and low sugar diet  Denies vision changes and neuropathy  - prescribed bydureon in the past and unable to obtain due to cost   - on lipitor since Dec 2019     High cholesterol: on lipitor   Tolerating it well        This is an established visit conducted via telemedicine with video. The patient has been instructed that this meets HIPAA criteria and acknowledges and agrees to this method of visitation. Pursuant to the emergency declaration under the [facility] waiver authority and the [employer] Act, this Virtual Visit was conducted, with patient's consent, to reduce the patient's risk of exposure to COVID-19 and provide continuity of care for an established patient. Services were provided through a video synchronous discussion virtually to substitute for in-person clinic visit. ROS:  Constitutional: negative for fevers, chills, anorexia and weight loss  10 systems reviewed and negative other than HPI     Past Medical History:   Diagnosis Date    Erectile dysfunction     Type 2 diabetes mellitus ([facility] [address]        Current Outpatient Medications on File Prior to Visit   Medication Sig Dispense Refill    sertraline (ZOLOFT) 25 mg tablet Take 1 Tab by mouth daily. 30 Tab 5    sildenafiL, pulmonary hypertension, (REVATIO) 20 mg tablet Take 1 Tab by mouth daily as needed (erectile dysfunction). 30 Tab 3    metFORMIN ER (GLUCOPHAGE XR) 500 mg tablet Take 2 Tabs by mouth two (2) times daily (after meals).  120 Tab 5    simvastatin (ZOCOR) 40 mg tablet Take 1 Tab by mouth nightly. 90 Tab 2    insulin degludec Chrystal Search FlexTouch U-100) 100 unit/mL (3 mL) inpn 35 Units by SubCUTAneous route daily. 4 Pen 5    metFORMIN ER (GLUCOPHAGE XR) 500 mg tablet TAKE 2 TABS BY MOUTH TWO (2) TIMES DAILY (AFTER MEALS). 120 Tab 5    sildenafiL, pulmonary hypertension, (REVATIO) 20 mg tablet TAKE 1 TAB BY MOUTH DAILY AS NEEDED (ERECTILE DYSFUNCTION). *PA DENIED* 30 Tab 3    cephALEXin (KEFLEX) 500 mg capsule Take 1 Cap by mouth four (4) times daily. 28 Cap 0    semaglutide (OZEMPIC) 0.25 mg/0.2 mL (2 mg/1.5 mL) sub-q pen 0.25 mg by SubCUTAneous route every seven (7) days. 1 Box 2    exenatide microspheres (BYDUREON BCISE) 2 mg/0.85 mL atIn 1 Each by SubCUTAneous route every seven (7) days. 4 Syringe 4     No current facility-administered medications on file prior to visit. History reviewed. No pertinent surgical history. Family History   Problem Relation Age of Onset    Diabetes Maternal Grandmother     Diabetes Maternal Grandfather     Diabetes Paternal Grandmother     Diabetes Paternal Grandfather      Reviewed and no changes     Social History     Socioeconomic History    Marital status:      Spouse name: Not on file    Number of children: Not on file    Years of education: Not on file    Highest education level: Not on file   Occupational History    Not on file   Social Needs    Financial resource strain: Not on file    Food insecurity     Worry: Not on file     Inability: Not on file    Transportation needs     Medical: Not on file     Non-medical: Not on file   Tobacco Use    Smoking status: Never Smoker    Smokeless tobacco: Never Used   Substance and Sexual Activity    Alcohol use:  Yes    Drug use: Never    Sexual activity: Yes     Partners: Female   Lifestyle    Physical activity     Days per week: Not on file     Minutes per session: Not on file    Stress: Not on file   Relationships    Social connections     Talks on phone: Not on file     Gets together: Not on file     Attends Episcopal service: Not on file     Active member of club or organization: Not on file     Attends meetings of clubs or organizations: Not on file     Relationship status: Not on file    Intimate partner violence     Fear of current or ex partner: Not on file     Emotionally abused: Not on file     Physically abused: Not on file     Forced sexual activity: Not on file   Other Topics Concern    Not on file   Social History Narrative    Not on file          There were no vitals taken for this visit. Physical Examination:   Gen: well appearing male  HEENT: normal conjunctiva, no audible congestion, patient does not see oral erythema, has MMM  Neck: patient does not feel enlarged or tender LAD or masses  Resp: normal respiratory effort, no audible wheezing. CV: patient does not feel palpitations or heart irregularity  Abd: patient does not feel abdominal tenderness or mass, patient does not notice distension  Extrem: patient does not see swelling in ankles or joints.    Neuro: Alert and oriented, able to answer questions without difficulty, able to move all extremities and walk normally          Lab Results   Component Value Date/Time    WBC 6.7 07/17/2019 11:42 AM    HGB 15.3 07/17/2019 11:42 AM    HCT 45.7 07/17/2019 11:42 AM    PLATELET 885 17/45/9475 11:42 AM    MCV 88.9 07/17/2019 11:42 AM     Lab Results   Component Value Date/Time    Sodium 141 01/02/2020 10:33 AM    Potassium 4.9 01/02/2020 10:33 AM    Chloride 104 01/02/2020 10:33 AM    CO2 21 01/02/2020 10:33 AM    Anion gap 6 07/17/2019 11:42 AM    Glucose 138 (H) 01/02/2020 10:33 AM    BUN 15 01/02/2020 10:33 AM    Creatinine 0.96 01/02/2020 10:33 AM    BUN/Creatinine ratio 16 01/02/2020 10:33 AM    GFR est  01/02/2020 10:33 AM    GFR est non- 01/02/2020 10:33 AM    Calcium 9.6 01/02/2020 10:33 AM     Lab Results   Component Value Date/Time    Cholesterol, total 168 08/30/2019 10:04 AM    HDL Cholesterol 35 (L) 2019 10:04 AM    LDL, calculated 109 (H) 2019 10:04 AM    VLDL, calculated 24 2019 10:04 AM    Triglyceride 118 2019 10:04 AM     Lab Results   Component Value Date/Time    TSH 1.980 2019 09:56 AM     No results found for: PSA, Lara Torrese, PSAR3, [id number], [id number], PSALT  Lab Results   Component Value Date/Time    Hemoglobin A1c 6.4 (H) 2019 09:56 AM     No results found for: VITD3, XQVID2, XQVID3, Tami Conchis, VD3RIA    Lab Results   Component Value Date/Time    ALT (SGPT) 90 (H) 2020 10:33 AM    Alk. phosphatase 109 2020 10:33 AM    Bilirubin, total 0.5 2020 10:33 AM           Assessment/Plan:    1. Uncontrolled type 2 diabetes mellitus with hyperglycemia (HCC)  -continue metformin  Titrate up tresiba to 46 units, discussed that if remains above 200 fasting in AM to increase by 2 units every 3 days   Continue metformin   Fitzgerald has been an issue with insulin and GLP drugs  Added glipizide 5mg with two largest meals today      2. High cholesterol: on lipitor    Will given sample of tresiba today since paying 300 dollars for medication - I have asked pharmacist to assist me with this     Follow up in 4 weeks - labs prior to next visit     Orders Placed This Encounter    METABOLIC PANEL, COMPREHENSIVE    HEMOGLOBIN A1C W/O EAG    LIPID PANEL    MICROALBUMIN, UR, RAND W/ MICROALB/CREAT RATIO    glipiZIDE (GLUCOTROL) 5 mg tablet     Sig: Take 1 Tab by mouth two (2) times daily (with meals). Dispense:  60 Tab     Refill:  2    DISCONTD: insulin degludec Jaki Machado FlexTouch U-100) 100 unit/mL (3 mL) inpn     Si Units by SubCUTAneous route daily. Dispense:  5 Pen     Refill:  0    insulin degludec Jaki Machado FlexTouch U-100) 100 unit/mL (3 mL) inpn     Si Units by SubCUTAneous route daily.      Dispense:  5 Pen     Refill:  Moustapha Cole MD    This is an established visit conducted via real time video and audio telemedicine. The patient has been instructed that this meets HIPAA criteria and acknowledges and agrees to this method of visitation.

## 2020-08-11 ENCOUNTER — TELEPHONE (OUTPATIENT)
Dept: INTERNAL MEDICINE CLINIC | Age: 35
End: 2020-08-11

## 2020-08-11 RX ORDER — INSULIN GLARGINE 100 [IU]/ML
INJECTION, SOLUTION SUBCUTANEOUS
Qty: 15 ML | Refills: 5 | Status: SHIPPED | OUTPATIENT
Start: 2020-08-11 | End: 2021-06-02

## 2020-08-11 RX ORDER — INSULIN GLARGINE 100 [IU]/ML
INJECTION, SOLUTION SUBCUTANEOUS
Qty: 15 ML | Refills: 5 | Status: SHIPPED | OUTPATIENT
Start: 2020-08-11 | End: 2020-08-11

## 2020-08-11 NOTE — TELEPHONE ENCOUNTER
Pharmacy Progress Note - Medication Access    Confirmed with Mr. Brain Reyes 38 y.o. 's [facility] pharmacy ([phone]). His Tresiba (1 box)'s copay is $355.67 with [employer] commercial & mfr coupon. Lantus is not covered w/ his insurance plan. Fiona Gals would be $95 (1 box) - with mfr coupon - this copay was reduced to $5/month. Called to discuss updated with Mr Eddie Amador. Verified PHI. Reports he increased [place] to 50 units last week. Fasting today was 149. Most readings have been in the 220s. Discussed differences between [place] and Fiona Gals. Recommend patient increase Basaglar to 54 units daily if BG remains > 180 by the end of this week. All questions answered at this time. Thank you for the consult,  Thu Hermon Najjar, PharmD, BCACP, CDE     Orders Placed This Encounter    DISCONTD: insulin glargine (LANTUS,BASAGLAR) 100 unit/mL (3 mL) inpn     Sig: Inject 46 units under the skin daily  Indications: type 2 diabetes mellitus     Dispense:  15 mL     Refill:  5     Replaces Tresiba due to cost    insulin glargine (LANTUS,BASAGLAR) 100 unit/mL (3 mL) inpn     Sig: Inject 54 units under the skin daily  Indications: type 2 diabetes mellitus     Dispense:  15 mL     Refill:  5     Replaces Tresiba due to cost. Dosage change.      Medications Discontinued During This Encounter   Medication Reason    insulin degludec Khushbu Slot FlexTouch U-100) 100 unit/mL (3 mL) inpn Cost of Medication    insulin degludec Khushbu Slot FlexTouch U-100) 100 unit/mL (3 mL) inpn Cost of Medication    insulin glargine (LANTUS,BASAGLAR) 100 unit/mL (3 mL) inpn            CLINICAL PHARMACY CONSULT: MED RECONCILIATION/REVIEW ADDENDUM    For Pharmacy Admin Tracking Only    PHSO: PHSO Patient?: Yes  Total # of Interventions Recommended: Count: 5  - Increased Dose #: 1  - Discontinued Medication #: 3 Discontinue Reason(s): Cost Conversion  - New Order #: 1 New Medication Order Reason(s): Cost Conversion    Time Spent (min): 27

## 2020-08-18 ENCOUNTER — TELEPHONE (OUTPATIENT)
Dept: INTERNAL MEDICINE CLINIC | Age: 35
End: 2020-08-18

## 2020-08-18 NOTE — TELEPHONE ENCOUNTER
Spoke with patient. Two pt identifiers confirmed. Patient advised that his labs have been ordered. Patient states that he would like to have his labs faxed to [facility]. Labs faxed and confirmation received.

## 2020-08-22 LAB
ALBUMIN SERPL-MCNC: 4.4 G/DL (ref 4–5)
ALBUMIN/CREAT UR: 4 MG/G CREAT (ref 0–29)
ALBUMIN/GLOB SERPL: 1.7 {RATIO} (ref 1.2–2.2)
ALP SERPL-CCNC: 109 IU/L (ref 39–117)
ALT SERPL-CCNC: 79 IU/L (ref 0–44)
AST SERPL-CCNC: 41 IU/L (ref 0–40)
BILIRUB SERPL-MCNC: 0.7 MG/DL (ref 0–1.2)
BUN SERPL-MCNC: 13 MG/DL (ref 6–20)
BUN/CREAT SERPL: 15 (ref 9–20)
CALCIUM SERPL-MCNC: 9.5 MG/DL (ref 8.7–10.2)
CHLORIDE SERPL-SCNC: 103 MMOL/L (ref 96–106)
CHOLEST SERPL-MCNC: 121 MG/DL (ref 100–199)
CO2 SERPL-SCNC: 23 MMOL/L (ref 20–29)
CREAT SERPL-MCNC: 0.88 MG/DL (ref 0.76–1.27)
CREAT UR-MCNC: 113.4 MG/DL
GLOBULIN SER CALC-MCNC: 2.6 G/DL (ref 1.5–4.5)
GLUCOSE SERPL-MCNC: 100 MG/DL (ref 65–99)
HBA1C MFR BLD: 10.3 % (ref 4.8–5.6)
HDLC SERPL-MCNC: 34 MG/DL
LDLC SERPL CALC-MCNC: 69 MG/DL (ref 0–99)
MICROALBUMIN UR-MCNC: 4.4 UG/ML
POTASSIUM SERPL-SCNC: 4.6 MMOL/L (ref 3.5–5.2)
PROT SERPL-MCNC: 7 G/DL (ref 6–8.5)
SODIUM SERPL-SCNC: 138 MMOL/L (ref 134–144)
TRIGL SERPL-MCNC: 91 MG/DL (ref 0–149)
VLDLC SERPL CALC-MCNC: 18 MG/DL (ref 5–40)

## 2020-08-27 ENCOUNTER — VIRTUAL VISIT (OUTPATIENT)
Dept: INTERNAL MEDICINE CLINIC | Age: 35
End: 2020-08-27
Payer: COMMERCIAL

## 2020-08-27 ENCOUNTER — PATIENT MESSAGE (OUTPATIENT)
Dept: INTERNAL MEDICINE CLINIC | Age: 35
End: 2020-08-27

## 2020-08-27 DIAGNOSIS — E78.00 HIGH CHOLESTEROL: ICD-10-CM

## 2020-08-27 DIAGNOSIS — N52.9 ERECTILE DYSFUNCTION, UNSPECIFIED ERECTILE DYSFUNCTION TYPE: ICD-10-CM

## 2020-08-27 DIAGNOSIS — H60.331 ACUTE SWIMMER'S EAR OF RIGHT SIDE: ICD-10-CM

## 2020-08-27 DIAGNOSIS — F41.9 ANXIETY: ICD-10-CM

## 2020-08-27 DIAGNOSIS — E11.65 UNCONTROLLED TYPE 2 DIABETES MELLITUS WITH HYPERGLYCEMIA (HCC): Primary | ICD-10-CM

## 2020-08-27 PROCEDURE — 99214 OFFICE O/P EST MOD 30 MIN: CPT | Performed by: INTERNAL MEDICINE

## 2020-08-27 RX ORDER — CIPROFLOXACIN HYDROCHLORIDE 3.5 MG/ML
SOLUTION/ DROPS TOPICAL
Qty: 5 ML | Refills: 0 | Status: SHIPPED | OUTPATIENT
Start: 2020-08-27 | End: 2021-02-23 | Stop reason: ALTCHOICE

## 2020-08-27 NOTE — PROGRESS NOTES
Reviewed record in preparation for visit and have obtained necessary documentation. Identified pt with two pt identifiers(name and ). Chief Complaint   Patient presents with   Taurusyola Gomes Diabetes       Health Maintenance Due   Topic Date Due    Eye Exam  1993    DTaP/Tdap/Td  (1 - Tdap) 2004    Hemoglobin A1C    2020    Diabetic Foot Care  2020    Albumin Urine Test  2020    Cholesterol Test   2020       Mr. Wynetta Hodgkin has a reminder for a \"due or due soon\" health maintenance. I have asked that he discuss this further with his primary care provider for follow-up on this health maintenance. Coordination of Care Questionnaire:  :     1) Have you been to an emergency room, urgent care clinic since your last visit? no   Hospitalized since your last visit? no             2) Have you seen or consulted any other health care providers outside of [address] [place] [address] since your last visit? no  (Include any pap smears or colon screenings in this section.)    3) In the event something were to happen to you and you were unable to speak on your behalf, do you have an Advance Directive/ Living Will in place stating your wishes? NO    Do you have an Advance Directive on file? no    4) Are you interested in receiving information on Advance Directives? NO    Patient is accompanied by self I have received verbal consent from Wellington Levi to discuss any/all medical information while they are present in the room.

## 2020-08-27 NOTE — PROGRESS NOTES
CC: Diabetes      HPI:    He is a 38 y.o. male who presents for evaluation of diabetes   We have been adjusting insulin to get to goal   Last 7 days 85 - 176 average 125  Lower numbers fasting and higher post prandial   Taking 50 units of Lantus    Metformin 1000 mg BID  Glipizide 5 mg with food     Not able to afford ozempic/ or any other GLP 1 after multiple attempts     300 lbs lost 18 lbs / averaging 12755 steps a day    Making better choices with diet improving     Making plans to see eye doctor   Burning and tingling in feet improved    Having pain on right ear with discharge since going to beach \" canal hurts\" no fever, no hearing changes, no trauma and no sinus pain  Onset 5-7 days ago     This is an established visit conducted via telemedicine with video. The patient has been instructed that this meets HIPAA criteria and acknowledges and agrees to this method of visitation. Pursuant to the emergency declaration under the [address] [place] [address] [facility] authority and the [employer] Act, this Virtual Visit was conducted, with patient's consent, to reduce the patient's risk of exposure to COVID-19 and provide continuity of care for an established patient. Services were provided through a video synchronous discussion virtually to substitute for in-person clinic visit.        ROS:  Constitutional: negative for fevers, chills, anorexia and weight loss  Ten systems reviewed and negative other than HPI    Past Medical History:   Diagnosis Date    Erectile dysfunction     Type 2 diabetes mellitus (HCC)        Current Outpatient Medications on File Prior to Visit   Medication Sig Dispense Refill    insulin glargine (LANTUS,BASAGLAR) 100 unit/mL (3 mL) inpn Inject 54 units under the skin daily  Indications: type 2 diabetes mellitus 15 mL 5    glipiZIDE (GLUCOTROL) 5 mg tablet Take 1 Tab by mouth two (2) times daily (with meals). 60 Tab 2    sertraline (ZOLOFT) 25 mg tablet Take 1 Tab by mouth daily. 30 Tab 5    sildenafiL, pulmonary hypertension, (REVATIO) 20 mg tablet Take 1 Tab by mouth daily as needed (erectile dysfunction). 30 Tab 3    metFORMIN ER (GLUCOPHAGE XR) 500 mg tablet Take 2 Tabs by mouth two (2) times daily (after meals). 120 Tab 5    simvastatin (ZOCOR) 40 mg tablet Take 1 Tab by mouth nightly. 90 Tab 2    semaglutide (OZEMPIC) 0.25 mg/0.2 mL (2 mg/1.5 mL) sub-q pen 0.25 mg by SubCUTAneous route every seven (7) days. 1 Box 2     No current facility-administered medications on file prior to visit. No past surgical history on file. Family History   Problem Relation Age of Onset    Diabetes Maternal Grandmother     Diabetes Maternal Grandfather     Diabetes Paternal Grandmother     Diabetes Paternal Grandfather      Reviewed and no changes     Social History     Socioeconomic History    Marital status:      Spouse name: Not on file    Number of children: Not on file    Years of education: Not on file    Highest education level: Not on file   Occupational History    Not on file   Social Needs    Financial resource strain: Not on file    Food insecurity     Worry: Not on file     Inability: Not on file    Transportation needs     Medical: Not on file     Non-medical: Not on file   Tobacco Use    Smoking status: Never Smoker    Smokeless tobacco: Never Used   Substance and Sexual Activity    Alcohol use:  Yes    Drug use: Never    Sexual activity: Yes     Partners: Female   Lifestyle    Physical activity     Days per week: Not on file     Minutes per session: Not on file    Stress: Not on file   Relationships    Social connections     Talks on phone: Not on file     Gets together: Not on file     Attends Restorationism service: Not on file     Active member of club or organization: Not on file     Attends meetings of clubs or organizations: Not on file     Relationship status: Not on file    Intimate partner violence     Fear of current or ex partner: Not on file     Emotionally abused: Not on file     Physically abused: Not on file     Forced sexual activity: Not on file   Other Topics Concern    Not on file   Social History Narrative    Not on file          There were no vitals taken for this visit. Physical Examination:   Gen: well appearing male  HEENT: normal conjunctiva, no audible congestion, patient does not see oral erythema, has MMM  Neck: patient does not feel enlarged or tender LAD or masses  Resp: normal respiratory effort, no audible wheezing. CV: patient does not feel palpitations or heart irregularity  Abd: patient does not feel abdominal tenderness or mass, patient does not notice distension  Extrem: patient does not see swelling in ankles or joints.    Neuro: Alert and oriented, able to answer questions without difficulty, able to move all extremities and walk normally          Lab Results   Component Value Date/Time    WBC 6.7 07/17/2019 11:42 AM    HGB 15.3 07/17/2019 11:42 AM    HCT 45.7 07/17/2019 11:42 AM    PLATELET 029 02/59/3475 11:42 AM    MCV 88.9 07/17/2019 11:42 AM     Lab Results   Component Value Date/Time    Sodium 138 08/21/2020 11:40 AM    Potassium 4.6 08/21/2020 11:40 AM    Chloride 103 08/21/2020 11:40 AM    CO2 23 08/21/2020 11:40 AM    Anion gap 6 07/17/2019 11:42 AM    Glucose 100 (H) 08/21/2020 11:40 AM    BUN 13 08/21/2020 11:40 AM    Creatinine 0.88 08/21/2020 11:40 AM    BUN/Creatinine ratio 15 08/21/2020 11:40 AM    GFR est  08/21/2020 11:40 AM    GFR est non- 08/21/2020 11:40 AM    Calcium 9.5 08/21/2020 11:40 AM     Lab Results   Component Value Date/Time    Cholesterol, total 121 08/21/2020 11:40 AM    HDL Cholesterol 34 (L) 08/21/2020 11:40 AM    LDL, calculated 69 08/21/2020 11:40 AM    VLDL, calculated 18 08/21/2020 11:40 AM    Triglyceride 91 08/21/2020 11:40 AM     Lab Results   Component Value Date/Time    TSH 1.980 12/05/2019 09:56 AM     No results found for: PSA, Santhosh Ames, PSAR3, [id number], [id number], PSALT  Lab Results   Component Value Date/Time    Hemoglobin A1c 10.3 (H) 08/21/2020 11:40 AM     No results found for: VITD3, XQVID2, XQVID3, Maureen Moose, VD3RIA    Lab Results   Component Value Date/Time    ALT (SGPT) 79 (H) 08/21/2020 11:40 AM    Alk. phosphatase 109 08/21/2020 11:40 AM    Bilirubin, total 0.7 08/21/2020 11:40 AM           Assessment/Plan:    1. Uncontrolled type 2 diabetes mellitus with hyperglycemia ([address]  - Finally making progress reports much improved glycemic control not reflected on HA1C since better numbers with average 125-140 for 2 weeks prior to that average was 200   - continue Taking 50 units of Lantus  Metformin 1000 mg BID  Glipizide 5 mg with food   Not able to afford ozempic/ or any other GLP 1 after multiple attempts   - HEMOGLOBIN A1C W/O EAG in 3months   Goal HA1C is 7 or less  Reminded to schedule eye exam    2. High cholesterol  At goal LDL less than 70 on simvastatin 40mg     3. Erectile dysfunction, unspecified erectile dysfunction type  PRN use of Rivatio is working well     4. Acute swimmer's ear of right side  - ciprofloxacin-hydrocortisone (CIPRO HC OTIC) otic suspension; Administer 3 Drops in right ear two (2) times a day. Dispense: 1 Bottle; Refill: 0    5. Anxiety  Well controlled on sertraline       Follow up in January       Reta Burch MD    This is an established visit conducted via real time video and audio telemedicine. The patient has been instructed that this meets HIPAA criteria and acknowledges and agrees to this method of visitation.

## 2020-08-28 NOTE — TELEPHONE ENCOUNTER
Ian Harvey 8/27/2020 1:15 PM EDT      ----- Message -----  From: Isa Mills  Sent: 8/27/2020 12:03 PM EDT  To: [facility]arm[facility]  Subject: Andriy Gray,     I went to  the ear drops and they stated it will be 309 and that it may be cheaper to do just the antibiotic eardrops without the mixture. I was curious if this was a possibility?

## 2020-09-04 ENCOUNTER — PATIENT MESSAGE (OUTPATIENT)
Dept: INTERNAL MEDICINE CLINIC | Age: 35
End: 2020-09-04

## 2020-09-04 DIAGNOSIS — E11.65 UNCONTROLLED TYPE 2 DIABETES MELLITUS WITH HYPERGLYCEMIA (HCC): Primary | ICD-10-CM

## 2020-09-08 RX ORDER — METFORMIN HYDROCHLORIDE 500 MG/1
1000 TABLET ORAL 2 TIMES DAILY WITH MEALS
Qty: 120 TAB | Refills: 2 | Status: SHIPPED | OUTPATIENT
Start: 2020-09-08 | End: 2020-10-07

## 2020-09-08 NOTE — TELEPHONE ENCOUNTER
Chel Weiss 9/8/2020 9:07 AM EDT      ----- Message -----  From: Trish Pagan  Sent: 9/4/2020 8:41 PM EDT  To: Mitul Monson  Subject: Prescription Question     Today I received a letter stating my metformin has been recalled, should I still be taking this?

## 2020-10-12 DIAGNOSIS — E11.65 UNCONTROLLED TYPE 2 DIABETES MELLITUS WITH HYPERGLYCEMIA (HCC): ICD-10-CM

## 2020-10-13 RX ORDER — GLIPIZIDE 5 MG/1
TABLET ORAL
Qty: 60 TAB | Refills: 2 | Status: SHIPPED | OUTPATIENT
Start: 2020-10-13 | End: 2021-01-08

## 2020-10-28 DIAGNOSIS — E11.65 UNCONTROLLED TYPE 2 DIABETES MELLITUS WITH HYPERGLYCEMIA (HCC): ICD-10-CM

## 2020-10-29 RX ORDER — SEMAGLUTIDE 1.34 MG/ML
INJECTION, SOLUTION SUBCUTANEOUS
Qty: 1 BOX | Refills: 2 | Status: SHIPPED | OUTPATIENT
Start: 2020-10-29 | End: 2021-01-22

## 2021-01-08 DIAGNOSIS — E11.65 UNCONTROLLED TYPE 2 DIABETES MELLITUS WITH HYPERGLYCEMIA (HCC): ICD-10-CM

## 2021-01-08 RX ORDER — METFORMIN HYDROCHLORIDE 500 MG/1
TABLET ORAL
Qty: 360 TAB | Refills: 0 | Status: SHIPPED | OUTPATIENT
Start: 2021-01-08 | End: 2021-04-07

## 2021-01-08 RX ORDER — GLIPIZIDE 5 MG/1
TABLET ORAL
Qty: 180 TAB | Refills: 0 | Status: SHIPPED | OUTPATIENT
Start: 2021-01-08 | End: 2021-02-24 | Stop reason: ALTCHOICE

## 2021-01-13 ENCOUNTER — TRANSCRIBE ORDER (OUTPATIENT)
Dept: SCHEDULING | Age: 36
End: 2021-01-13

## 2021-01-13 ENCOUNTER — HOSPITAL ENCOUNTER (OUTPATIENT)
Dept: ULTRASOUND IMAGING | Age: 36
Discharge: HOME | End: 2021-01-13
Payer: COMMERCIAL

## 2021-01-13 DIAGNOSIS — R10.11 ABDOMINAL PAIN, RIGHT UPPER QUADRANT: Primary | ICD-10-CM

## 2021-01-13 DIAGNOSIS — R10.11 ABDOMINAL PAIN, RIGHT UPPER QUADRANT: ICD-10-CM

## 2021-01-13 PROCEDURE — 76705 ECHO EXAM OF ABDOMEN: CPT

## 2021-01-22 DIAGNOSIS — N52.9 ERECTILE DYSFUNCTION, UNSPECIFIED ERECTILE DYSFUNCTION TYPE: ICD-10-CM

## 2021-01-22 DIAGNOSIS — E11.65 UNCONTROLLED TYPE 2 DIABETES MELLITUS WITH HYPERGLYCEMIA (HCC): ICD-10-CM

## 2021-01-22 RX ORDER — SILDENAFIL CITRATE 20 MG/1
20 TABLET ORAL
Qty: 90 TAB | Refills: 1 | Status: SHIPPED | OUTPATIENT
Start: 2021-01-22 | End: 2021-07-23 | Stop reason: SDUPTHER

## 2021-01-22 RX ORDER — SEMAGLUTIDE 1.34 MG/ML
INJECTION, SOLUTION SUBCUTANEOUS
Qty: 1 BOX | Refills: 0 | Status: SHIPPED | OUTPATIENT
Start: 2021-01-22 | End: 2021-02-16 | Stop reason: SDUPTHER

## 2021-01-30 DIAGNOSIS — F41.9 ANXIETY: ICD-10-CM

## 2021-01-31 RX ORDER — SERTRALINE HYDROCHLORIDE 25 MG/1
TABLET, FILM COATED ORAL
Qty: 30 TAB | Refills: 5 | Status: SHIPPED | OUTPATIENT
Start: 2021-01-31 | End: 2021-02-23 | Stop reason: DRUGHIGH

## 2021-02-16 DIAGNOSIS — E11.65 UNCONTROLLED TYPE 2 DIABETES MELLITUS WITH HYPERGLYCEMIA (HCC): ICD-10-CM

## 2021-02-16 RX ORDER — SEMAGLUTIDE 1.34 MG/ML
INJECTION, SOLUTION SUBCUTANEOUS
Qty: 1 BOX | Refills: 0 | Status: SHIPPED | OUTPATIENT
Start: 2021-02-16 | End: 2021-08-12 | Stop reason: SDUPTHER

## 2021-02-16 NOTE — TELEPHONE ENCOUNTER
Future Appointments:  Future Appointments   Date Time Provider Damaris Carmen   2/23/2021 11:30 AM Appa Jimena Barfield MD [facility] AMB        Last Appointment With Me:  8/27/2020     Requested Prescriptions     Pending Prescriptions Disp Refills    semaglutide (Ozempic) 0.25 mg/0.2 mL (2 mg/1.5 mL) sub-q pen 1 Box 0     Sig: INJECT 0.25 MG BY SUBCUTANEOUS ROUTE EVERY SEVEN (7) DAYS.

## 2021-02-23 ENCOUNTER — OFFICE VISIT (OUTPATIENT)
Dept: INTERNAL MEDICINE CLINIC | Age: 36
End: 2021-02-23
Payer: COMMERCIAL

## 2021-02-23 VITALS
WEIGHT: 314 LBS | DIASTOLIC BLOOD PRESSURE: 82 MMHG | RESPIRATION RATE: 18 BRPM | HEIGHT: 68 IN | TEMPERATURE: 96.3 F | OXYGEN SATURATION: 99 % | SYSTOLIC BLOOD PRESSURE: 139 MMHG | BODY MASS INDEX: 47.59 KG/M2 | HEART RATE: 97 BPM

## 2021-02-23 DIAGNOSIS — E78.00 HIGH CHOLESTEROL: ICD-10-CM

## 2021-02-23 DIAGNOSIS — E11.65 UNCONTROLLED TYPE 2 DIABETES MELLITUS WITH HYPERGLYCEMIA (HCC): Primary | ICD-10-CM

## 2021-02-23 DIAGNOSIS — F41.9 ANXIETY: ICD-10-CM

## 2021-02-23 DIAGNOSIS — R10.11 RIGHT UPPER QUADRANT PAIN: ICD-10-CM

## 2021-02-23 PROCEDURE — 99214 OFFICE O/P EST MOD 30 MIN: CPT | Performed by: INTERNAL MEDICINE

## 2021-02-23 RX ORDER — SERTRALINE HYDROCHLORIDE 50 MG/1
50 TABLET, FILM COATED ORAL
Qty: 30 TAB | Refills: 2 | Status: SHIPPED | OUTPATIENT
Start: 2021-02-23 | End: 2021-04-28 | Stop reason: SDUPTHER

## 2021-02-23 NOTE — PROGRESS NOTES
Reviewed record in preparation for visit and have obtained necessary documentation. Identified pt with two pt identifiers(name and ). Chief Complaint   Patient presents with    Diabetes    Rib Pain       Health Maintenance Due   Topic Date Due    Hepatitis C Test  1983    Eye Exam  1993    COVID-19 Vaccine (1 of 2) 1999    DTaP/Tdap/Td  (1 - Tdap) 2004    Diabetic Foot Care  2020    Hemoglobin A1C    2020       Mr. Valentino Rhein has a reminder for a \"due or due soon\" health maintenance. I have asked that he discuss this further with his primary care provider for follow-up on this health maintenance. Coordination of Care Questionnaire:  :     1) Have you been to an emergency room, urgent care clinic since your last visit? no   Hospitalized since your last visit? no             2) Have you seen or consulted any other health care providers outside of [address] [place] [address] since your last visit? no  (Include any pap smears or colon screenings in this section.)    3) In the event something were to happen to you and you were unable to speak on your behalf, do you have an Advance Directive/ Living Will in place stating your wishes? NO    Do you have an Advance Directive on file? no    4) Are you interested in receiving information on Advance Directives? NO    Patient is accompanied by self I have received verbal consent from Martina Barth to discuss any/all medical information while they are present in the room.

## 2021-02-23 NOTE — PROGRESS NOTES
CC: Diabetes and right upper quadrant pain       HPI:    He is a 38 y.o. male who presents for evaluation of diabetes and RUQ pain      He is currently on   lantus 46 units   Last 7 days 76 highest 130s   Lower numbers fasting and higher post prandial    Metformin 1000 mg BID  Glipizide 5 mg with food     Finally had ozempic approved     Saw eye MD in October     Current weight 314 lbs   Since change in jobs eating out more    Intermittent sharp pain on right side underneath ribs. Had some nausea. [facility]   Reviewed with patient on his my chart      Depression anxiety: having more anxiety. Not sleeping well. Falls asleep frequent waking and cant fall back asleep   Has tried z quil at night  Denies suicidal thoughts  At times more irritable  Denies anhedonia  Felt significantly better with sertraline 25 mg in the beginning but now having more anxiety     ROS:  Constitutional: negative for fevers, chills, anorexia and weight loss  10 systems reviewed and negative other than HPI     Past Medical History:   Diagnosis Date    Erectile dysfunction     Type 2 diabetes mellitus ([facility]        Current Outpatient Medications on File Prior to Visit   Medication Sig Dispense Refill    semaglutide (Ozempic) 0.25 mg/0.2 mL (2 mg/1.5 mL) sub-q pen INJECT 0.25 MG BY SUBCUTANEOUS ROUTE EVERY SEVEN (7) DAYS. 1 Box 0    sildenafiL, pulmonary hypertension, (REVATIO) 20 mg tablet TAKE 1 TAB BY MOUTH DAILY AS NEEDED (ERECTILE DYSFUNCTION). 90 Tab 1    metFORMIN (GLUCOPHAGE) 500 mg tablet TAKE 2 TABLETS BY MOUTH TWICE A DAY WITH MEALS 360 Tab 0    glipiZIDE (GLUCOTROL) 5 mg tablet TAKE 1 TABLET BY MOUTH TWICE A DAY WITH MEALS 180 Tab 0    insulin glargine (LANTUS,BASAGLAR) 100 unit/mL (3 mL) inpn Inject 54 units under the skin daily  Indications: type 2 diabetes mellitus 15 mL 5    simvastatin (ZOCOR) 40 mg tablet Take 1 Tab by mouth nightly.  90 Tab 2     No current facility-administered medications on file prior to visit. Past Surgical History:   Procedure Laterality Date    HX VASECTOMY         Family History   Problem Relation Age of Onset    Diabetes Maternal Grandmother     Diabetes Maternal Grandfather     Diabetes Paternal Grandmother     Diabetes Paternal Grandfather      Reviewed and no changes     Social History     Socioeconomic History    Marital status:      Spouse name: Not on file    Number of children: Not on file    Years of education: Not on file    Highest education level: Not on file   Occupational History    Not on file   Social Needs    Financial resource strain: Not on file    Food insecurity     Worry: Not on file     Inability: Not on file    Transportation needs     Medical: Not on file     Non-medical: Not on file   Tobacco Use    Smoking status: Never Smoker    Smokeless tobacco: Never Used   Substance and Sexual Activity    Alcohol use:  Yes    Drug use: Never    Sexual activity: Yes     Partners: Female   Lifestyle    Physical activity     Days per week: Not on file     Minutes per session: Not on file    Stress: Not on file   Relationships    Social connections     Talks on phone: Not on file     Gets together: Not on file     Attends Christianity service: Not on file     Active member of club or organization: Not on file     Attends meetings of clubs or organizations: Not on file     Relationship status: Not on file    Intimate partner violence     Fear of current or ex partner: Not on file     Emotionally abused: Not on file     Physically abused: Not on file     Forced sexual activity: Not on file   Other Topics Concern    Not on file   Social History Narrative    Not on file            Visit Vitals  /82 (BP 1 Location: Right arm, BP Patient Position: Sitting, BP Cuff Size: Adult)   Pulse 97   Temp (!) 96.3 °F (35.7 °C) (Axillary)   Resp 18   Ht 5' 8\" (1.727 m)   Wt 314 lb (142.4 kg)   SpO2 99%   BMI 47.74 kg/m²       Physical Examination: Gen: well appearing male  HEENT: normal conjunctiva,nl external canal , nl TM membrane  Neck: no  LAD or masses  Resp: normal respiratory effort, no audible wheezing  CV: RRR nl S1S2 no m/g/r  Abd:+BS soft , NT ND   Extrem: no edema   Neuro: Alert and oriented, non focal          Monofilament R - normal sensation with micro filament  L - normal sensation with micro filament   Pulse DP R - 2+ (normal)  L - 2+ (normal)   Pulse TP R - 2+ (normal)  L - 2+ (normal)   Structural deformity R - None  L - None   Skin Integrity / Deformity R - None  L - None       Lab Results   Component Value Date/Time    WBC 6.7 07/17/2019 11:42 AM    HGB 15.3 07/17/2019 11:42 AM    HCT 45.7 07/17/2019 11:42 AM    PLATELET 357 59/35/6824 11:42 AM    MCV 88.9 07/17/2019 11:42 AM     Lab Results   Component Value Date/Time    Sodium 138 08/21/2020 11:40 AM    Potassium 4.6 08/21/2020 11:40 AM    Chloride 103 08/21/2020 11:40 AM    CO2 23 08/21/2020 11:40 AM    Anion gap 6 07/17/2019 11:42 AM    Glucose 100 (H) 08/21/2020 11:40 AM    BUN 13 08/21/2020 11:40 AM    Creatinine 0.88 08/21/2020 11:40 AM    BUN/Creatinine ratio 15 08/21/2020 11:40 AM    GFR est  08/21/2020 11:40 AM    GFR est non- 08/21/2020 11:40 AM    Calcium 9.5 08/21/2020 11:40 AM     Lab Results   Component Value Date/Time    Cholesterol, total 121 08/21/2020 11:40 AM    HDL Cholesterol 34 (L) 08/21/2020 11:40 AM    LDL, calculated 69 08/21/2020 11:40 AM    VLDL, calculated 18 08/21/2020 11:40 AM    Triglyceride 91 08/21/2020 11:40 AM     Lab Results   Component Value Date/Time    TSH 1.980 12/05/2019 09:56 AM     No results found for: PSA, Mancil Rosy, PSAR3, [id number], [id number]  Lab Results   Component Value Date/Time    Hemoglobin A1c 10.3 (H) 08/21/2020 11:40 AM     No results found for: VITD3, XQVID2, XQVID3, XQVID, VD3RIA    Lab Results   Component Value Date/Time    ALT (SGPT) 79 (H) 08/21/2020 11:40 AM    Alk.  phosphatase 109 08/21/2020 11:40 AM    Bilirubin, total 0.7 08/21/2020 11:40 AM      mild hepatomegaly with fatty liver      Assessment/Plan:    1. Uncontrolled type 2 diabetes mellitus with hyperglycemia ([address]  Last HA1C was uncontrolled reports better numbers  - now on lantus 46 units, metformin 1000mg BID and ozempic. Also taking glipizide 5 with meals - hoping to stop glipizide   - HEMOGLOBIN A1C W/O EAG  - METABOLIC PANEL, COMPREHENSIVE  - CBC WITH AUTOMATED DIFF    2. High cholesterol  In simvastatin LDL at goal   - METABOLIC PANEL, COMPREHENSIVE  - CBC WITH AUTOMATED DIFF    3. Right upper quadrant pain  Reviewed US of abdomen RUQ with mild enlarged fatty liver, Discussed weight loss . Will obtain  - [place] HEPATOBILIARY DUCT SCAN; Future to rule out gallbladder disease     4. Anxiety  Increased anxiety increase zoloft  - sertraline (ZOLOFT) 50 mg tablet; Take 1 Tab by mouth nightly. Dispense: 30 Tab; Refill: 2    Take melatonin 3 mg long acting at bedtime can increase to 6 mg       Follow-up and Dispositions    · Return in about 3 months (around 5/23/2021) for diabetes .          Saba Guadalupe MD    In person

## 2021-02-23 NOTE — PATIENT INSTRUCTIONS
Need record of eye exam  
 
Increased zoloft to 50mg daily Check HA1C if 7 or less will stop the glipizide Take melatonin 3 mg long acting at bedtime can increase to 6 mg

## 2021-02-24 ENCOUNTER — PATIENT MESSAGE (OUTPATIENT)
Dept: INTERNAL MEDICINE CLINIC | Age: 36
End: 2021-02-24

## 2021-02-24 LAB
ALBUMIN SERPL-MCNC: 4.4 G/DL (ref 4–5)
ALBUMIN/GLOB SERPL: 1.4 {RATIO} (ref 1.2–2.2)
ALP SERPL-CCNC: 93 IU/L (ref 39–117)
ALT SERPL-CCNC: 69 IU/L (ref 0–44)
AST SERPL-CCNC: 41 IU/L (ref 0–40)
BASOPHILS # BLD AUTO: 0 X10E3/UL (ref 0–0.2)
BASOPHILS NFR BLD AUTO: 0 %
BILIRUB SERPL-MCNC: 0.7 MG/DL (ref 0–1.2)
BUN SERPL-MCNC: 13 MG/DL (ref 6–20)
BUN/CREAT SERPL: 14 (ref 9–20)
CALCIUM SERPL-MCNC: 9.7 MG/DL (ref 8.7–10.2)
CHLORIDE SERPL-SCNC: 102 MMOL/L (ref 96–106)
CO2 SERPL-SCNC: 26 MMOL/L (ref 20–29)
CREAT SERPL-MCNC: 0.93 MG/DL (ref 0.76–1.27)
EOSINOPHIL # BLD AUTO: 0.2 X10E3/UL (ref 0–0.4)
EOSINOPHIL NFR BLD AUTO: 2 %
ERYTHROCYTE [DISTWIDTH] IN BLOOD BY AUTOMATED COUNT: 12 % (ref 11.6–15.4)
GLOBULIN SER CALC-MCNC: 3.1 G/DL (ref 1.5–4.5)
GLUCOSE SERPL-MCNC: 60 MG/DL (ref 65–99)
HBA1C MFR BLD: 5.5 % (ref 4.8–5.6)
HCT VFR BLD AUTO: 44.3 % (ref 37.5–51)
HGB BLD-MCNC: 14.7 G/DL (ref 13–17.7)
IMM GRANULOCYTES # BLD AUTO: 0 X10E3/UL (ref 0–0.1)
IMM GRANULOCYTES NFR BLD AUTO: 0 %
LYMPHOCYTES # BLD AUTO: 2 X10E3/UL (ref 0.7–3.1)
LYMPHOCYTES NFR BLD AUTO: 26 %
MCH RBC QN AUTO: 30.7 PG (ref 26.6–33)
MCHC RBC AUTO-ENTMCNC: 33.2 G/DL (ref 31.5–35.7)
MCV RBC AUTO: 93 FL (ref 79–97)
MONOCYTES # BLD AUTO: 0.6 X10E3/UL (ref 0.1–0.9)
MONOCYTES NFR BLD AUTO: 8 %
NEUTROPHILS # BLD AUTO: 4.9 X10E3/UL (ref 1.4–7)
NEUTROPHILS NFR BLD AUTO: 64 %
PLATELET # BLD AUTO: 297 X10E3/UL (ref 150–450)
POTASSIUM SERPL-SCNC: 4.5 MMOL/L (ref 3.5–5.2)
PROT SERPL-MCNC: 7.5 G/DL (ref 6–8.5)
RBC # BLD AUTO: 4.79 X10E6/UL (ref 4.14–5.8)
SODIUM SERPL-SCNC: 141 MMOL/L (ref 134–144)
WBC # BLD AUTO: 7.6 X10E3/UL (ref 3.4–10.8)

## 2021-02-24 NOTE — PROGRESS NOTES
Great improvement hemoglobin A1C is 5.5 .  Stop the glipizide and continue with insulin and ozempic  We may need to increase by 2-6 units the insulin since stopping the glipizide let me know fasting blood sugars in the next 2- 3 weeks

## 2021-03-13 RX ORDER — SIMVASTATIN 40 MG/1
TABLET, FILM COATED ORAL
Qty: 90 TAB | Refills: 2 | Status: SHIPPED | OUTPATIENT
Start: 2021-03-13 | End: 2021-12-07

## 2021-04-07 DIAGNOSIS — E11.65 UNCONTROLLED TYPE 2 DIABETES MELLITUS WITH HYPERGLYCEMIA (HCC): ICD-10-CM

## 2021-04-07 RX ORDER — METFORMIN HYDROCHLORIDE 500 MG/1
TABLET ORAL
Qty: 360 TAB | Refills: 0 | Status: SHIPPED | OUTPATIENT
Start: 2021-04-07 | End: 2021-07-06

## 2021-04-28 DIAGNOSIS — F41.9 ANXIETY: ICD-10-CM

## 2021-04-28 RX ORDER — SERTRALINE HYDROCHLORIDE 50 MG/1
50 TABLET, FILM COATED ORAL
Qty: 30 TAB | Refills: 2 | Status: SHIPPED | OUTPATIENT
Start: 2021-04-28 | End: 2021-08-12 | Stop reason: ALTCHOICE

## 2021-06-02 RX ORDER — INSULIN GLARGINE 100 [IU]/ML
INJECTION, SOLUTION SUBCUTANEOUS
Qty: 6 PEN | Refills: 1 | Status: SHIPPED | OUTPATIENT
Start: 2021-06-02 | End: 2021-06-03

## 2021-06-03 RX ORDER — INSULIN GLARGINE 100 [IU]/ML
INJECTION, SOLUTION SUBCUTANEOUS
Qty: 6 PEN | Refills: 1 | Status: SHIPPED | OUTPATIENT
Start: 2021-06-03 | End: 2021-10-06

## 2021-07-06 DIAGNOSIS — E11.65 UNCONTROLLED TYPE 2 DIABETES MELLITUS WITH HYPERGLYCEMIA (HCC): ICD-10-CM

## 2021-07-06 RX ORDER — METFORMIN HYDROCHLORIDE 500 MG/1
TABLET ORAL
Qty: 360 TABLET | Refills: 0 | Status: SHIPPED | OUTPATIENT
Start: 2021-07-06 | End: 2021-10-19

## 2021-07-23 DIAGNOSIS — N52.9 ERECTILE DYSFUNCTION, UNSPECIFIED ERECTILE DYSFUNCTION TYPE: ICD-10-CM

## 2021-07-23 RX ORDER — SILDENAFIL CITRATE 20 MG/1
TABLET ORAL
Qty: 90 TABLET | Refills: 1 | Status: SHIPPED | OUTPATIENT
Start: 2021-07-23 | End: 2021-10-15 | Stop reason: SDUPTHER

## 2021-08-12 ENCOUNTER — VIRTUAL VISIT (OUTPATIENT)
Dept: INTERNAL MEDICINE CLINIC | Age: 36
End: 2021-08-12
Payer: COMMERCIAL

## 2021-08-12 DIAGNOSIS — R42 DIZZINESS: ICD-10-CM

## 2021-08-12 DIAGNOSIS — E78.00 HIGH CHOLESTEROL: Primary | ICD-10-CM

## 2021-08-12 DIAGNOSIS — E11.65 UNCONTROLLED TYPE 2 DIABETES MELLITUS WITH HYPERGLYCEMIA (HCC): ICD-10-CM

## 2021-08-12 DIAGNOSIS — Z11.59 ENCOUNTER FOR HEPATITIS C SCREENING TEST FOR LOW RISK PATIENT: ICD-10-CM

## 2021-08-12 PROCEDURE — 99214 OFFICE O/P EST MOD 30 MIN: CPT | Performed by: INTERNAL MEDICINE

## 2021-08-12 RX ORDER — SEMAGLUTIDE 1.34 MG/ML
INJECTION, SOLUTION SUBCUTANEOUS
Qty: 1 BOX | Refills: 1 | Status: SHIPPED | OUTPATIENT
Start: 2021-08-12 | End: 2021-10-15

## 2021-08-13 ENCOUNTER — LAB ONLY (OUTPATIENT)
Dept: INTERNAL MEDICINE CLINIC | Age: 36
End: 2021-08-13

## 2021-08-13 DIAGNOSIS — E11.65 UNCONTROLLED TYPE 2 DIABETES MELLITUS WITH HYPERGLYCEMIA (HCC): ICD-10-CM

## 2021-08-13 DIAGNOSIS — Z11.59 ENCOUNTER FOR HEPATITIS C SCREENING TEST FOR LOW RISK PATIENT: ICD-10-CM

## 2021-08-13 DIAGNOSIS — R42 DIZZINESS: ICD-10-CM

## 2021-08-13 LAB
ALBUMIN SERPL-MCNC: 3.9 G/DL (ref 3.5–5)
ALBUMIN/GLOB SERPL: 1.1 {RATIO} (ref 1.1–2.2)
ALP SERPL-CCNC: 99 U/L (ref 45–117)
ALT SERPL-CCNC: 125 U/L (ref 12–78)
ANION GAP SERPL CALC-SCNC: 6 MMOL/L (ref 5–15)
AST SERPL-CCNC: 56 U/L (ref 15–37)
BASOPHILS # BLD: 0 K/UL (ref 0–0.1)
BASOPHILS NFR BLD: 0 % (ref 0–1)
BILIRUB SERPL-MCNC: 1.1 MG/DL (ref 0.2–1)
BUN SERPL-MCNC: 14 MG/DL (ref 6–20)
BUN/CREAT SERPL: 17 (ref 12–20)
CALCIUM SERPL-MCNC: 8.9 MG/DL (ref 8.5–10.1)
CHLORIDE SERPL-SCNC: 106 MMOL/L (ref 97–108)
CHOLEST SERPL-MCNC: 152 MG/DL
CO2 SERPL-SCNC: 26 MMOL/L (ref 21–32)
CREAT SERPL-MCNC: 0.83 MG/DL (ref 0.7–1.3)
CREAT UR-MCNC: 149 MG/DL
DIFFERENTIAL METHOD BLD: NORMAL
EOSINOPHIL # BLD: 0.1 K/UL (ref 0–0.4)
EOSINOPHIL NFR BLD: 2 % (ref 0–7)
ERYTHROCYTE [DISTWIDTH] IN BLOOD BY AUTOMATED COUNT: 12.4 % (ref 11.5–14.5)
EST. AVERAGE GLUCOSE BLD GHB EST-MCNC: 140 MG/DL
GLOBULIN SER CALC-MCNC: 3.7 G/DL (ref 2–4)
GLUCOSE SERPL-MCNC: 122 MG/DL (ref 65–100)
HBA1C MFR BLD: 6.5 % (ref 4–5.6)
HCT VFR BLD AUTO: 42 % (ref 36.6–50.3)
HCV AB SERPL QL IA: NONREACTIVE
HDLC SERPL-MCNC: 38 MG/DL
HDLC SERPL: 4 {RATIO} (ref 0–5)
HGB BLD-MCNC: 14.2 G/DL (ref 12.1–17)
IMM GRANULOCYTES # BLD AUTO: 0 K/UL (ref 0–0.04)
IMM GRANULOCYTES NFR BLD AUTO: 0 % (ref 0–0.5)
LDLC SERPL CALC-MCNC: 90.6 MG/DL (ref 0–100)
LYMPHOCYTES # BLD: 1.7 K/UL (ref 0.8–3.5)
LYMPHOCYTES NFR BLD: 23 % (ref 12–49)
MCH RBC QN AUTO: 30.3 PG (ref 26–34)
MCHC RBC AUTO-ENTMCNC: 33.8 G/DL (ref 30–36.5)
MCV RBC AUTO: 89.6 FL (ref 80–99)
MICROALBUMIN UR-MCNC: 0.73 MG/DL
MICROALBUMIN/CREAT UR-RTO: 5 MG/G (ref 0–30)
MONOCYTES # BLD: 0.5 K/UL (ref 0–1)
MONOCYTES NFR BLD: 7 % (ref 5–13)
NEUTS SEG # BLD: 4.8 K/UL (ref 1.8–8)
NEUTS SEG NFR BLD: 68 % (ref 32–75)
NRBC # BLD: 0 K/UL (ref 0–0.01)
NRBC BLD-RTO: 0 PER 100 WBC
PLATELET # BLD AUTO: 302 K/UL (ref 150–400)
PMV BLD AUTO: 10.2 FL (ref 8.9–12.9)
POTASSIUM SERPL-SCNC: 4.3 MMOL/L (ref 3.5–5.1)
PROT SERPL-MCNC: 7.6 G/DL (ref 6.4–8.2)
RBC # BLD AUTO: 4.69 M/UL (ref 4.1–5.7)
SODIUM SERPL-SCNC: 138 MMOL/L (ref 136–145)
TRIGL SERPL-MCNC: 117 MG/DL (ref ?–150)
TSH SERPL DL<=0.05 MIU/L-ACNC: 2.37 UIU/ML (ref 0.36–3.74)
UR CULT HOLD, URHOLD: NORMAL
VLDLC SERPL CALC-MCNC: 23.4 MG/DL
WBC # BLD AUTO: 7.2 K/UL (ref 4.1–11.1)

## 2021-08-14 NOTE — PROGRESS NOTES
No protein in the urine which is normal.Cholesterol looks good  Normal blood countNormal thyroid  Diabetes control is outstanding hemoglobin A1c 6.5 which is at goal.  Mild liver enzyme elevation which is due to fatty liver and diabetes this has been present for 2 years.   Keep up the good workMessage sent on my chart

## 2021-10-15 DIAGNOSIS — E11.65 UNCONTROLLED TYPE 2 DIABETES MELLITUS WITH HYPERGLYCEMIA (HCC): ICD-10-CM

## 2021-10-15 DIAGNOSIS — N52.9 ERECTILE DYSFUNCTION, UNSPECIFIED ERECTILE DYSFUNCTION TYPE: ICD-10-CM

## 2021-10-15 RX ORDER — SEMAGLUTIDE 1.34 MG/ML
INJECTION, SOLUTION SUBCUTANEOUS
Qty: 1 BOX | Refills: 1 | Status: SHIPPED | OUTPATIENT
Start: 2021-10-15 | End: 2021-10-15 | Stop reason: SDUPTHER

## 2021-10-18 DIAGNOSIS — E11.65 UNCONTROLLED TYPE 2 DIABETES MELLITUS WITH HYPERGLYCEMIA (HCC): ICD-10-CM

## 2021-10-18 RX ORDER — SEMAGLUTIDE 1.34 MG/ML
0.25 INJECTION, SOLUTION SUBCUTANEOUS
Qty: 1 BOX | Refills: 2 | Status: SHIPPED | OUTPATIENT
Start: 2021-10-18

## 2021-10-18 RX ORDER — SILDENAFIL CITRATE 20 MG/1
20 TABLET ORAL
Qty: 90 TABLET | Refills: 1 | Status: SHIPPED | OUTPATIENT
Start: 2021-10-18 | End: 2022-02-20 | Stop reason: SDUPTHER

## 2021-10-18 NOTE — TELEPHONE ENCOUNTER
Future Appointments:  No future appointments.      Last Appointment With Me:  8/12/2021     Requested Prescriptions     Pending Prescriptions Disp Refills    sildenafiL, pulmonary hypertension, (REVATIO) 20 mg tablet 90 Tablet 1    semaglutide (Ozempic) 0.25 mg or 0.5 mg/dose (2 mg/1.5 ml) subq pen 1 Box 1

## 2021-10-19 RX ORDER — METFORMIN HYDROCHLORIDE 500 MG/1
TABLET ORAL
Qty: 360 TABLET | Refills: 0 | Status: SHIPPED | OUTPATIENT
Start: 2021-10-19 | End: 2022-02-21

## 2021-12-06 ENCOUNTER — PATIENT MESSAGE (OUTPATIENT)
Dept: INTERNAL MEDICINE CLINIC | Age: 36
End: 2021-12-06

## 2021-12-06 RX ORDER — SERTRALINE HYDROCHLORIDE 50 MG/1
50 TABLET, FILM COATED ORAL DAILY
Qty: 90 TABLET | Refills: 1 | Status: SHIPPED | OUTPATIENT
Start: 2021-12-06 | End: 2022-05-15

## 2021-12-06 NOTE — TELEPHONE ENCOUNTER
Desirae November 12/6/2021 9:14 AM EST      ----- Message -----  From: Media Kristina  Sent: 12/6/2021 8:33 AM EST  To: [facility] Nurse Pool  Subject: Medication     Yes please I have noticed a sharp decline in my mood and rise in my anxiety.

## 2021-12-07 RX ORDER — SIMVASTATIN 40 MG/1
TABLET, FILM COATED ORAL
Qty: 90 TABLET | Refills: 2 | Status: SHIPPED | OUTPATIENT
Start: 2021-12-07 | End: 2022-08-03 | Stop reason: SDUPTHER

## 2022-01-20 ENCOUNTER — VIRTUAL VISIT (OUTPATIENT)
Dept: INTERNAL MEDICINE CLINIC | Age: 37
End: 2022-01-20
Payer: COMMERCIAL

## 2022-01-20 ENCOUNTER — PATIENT MESSAGE (OUTPATIENT)
Dept: INTERNAL MEDICINE CLINIC | Age: 37
End: 2022-01-20

## 2022-01-20 DIAGNOSIS — U07.1 COVID-19: ICD-10-CM

## 2022-01-20 DIAGNOSIS — J02.0 STREP THROAT: Primary | ICD-10-CM

## 2022-01-20 PROCEDURE — 99214 OFFICE O/P EST MOD 30 MIN: CPT | Performed by: FAMILY MEDICINE

## 2022-01-20 RX ORDER — BENZONATATE 200 MG/1
200 CAPSULE ORAL
Qty: 21 CAPSULE | Refills: 0 | Status: SHIPPED | OUTPATIENT
Start: 2022-01-20 | End: 2022-01-27

## 2022-01-20 RX ORDER — AZITHROMYCIN 250 MG/1
250 TABLET, FILM COATED ORAL SEE ADMIN INSTRUCTIONS
Qty: 6 TABLET | Refills: 0 | Status: SHIPPED | OUTPATIENT
Start: 2022-01-20 | End: 2022-01-25

## 2022-01-20 NOTE — PROGRESS NOTES
Identified pt with two pt identifiers(name and ). Reviewed record in preparation for visit and have obtained necessary documentation. Chief Complaint   Patient presents with    Sore Throat     white patches on the back of his throat. about 5 days.  Positive For Covid-19     home test yesterday. There were no vitals filed for this visit. Health Maintenance Due   Topic    Eye Exam Retinal or Dilated     DTaP/Tdap/Td series (1 - Tdap)    Foot Exam Q1     Flu Vaccine (1)    COVID-19 Vaccine (3 - Booster for Moderna series)       Coordination of Care Questionnaire:  :   1) Have you been to an emergency room, urgent care, or hospitalized since your last visit? If yes, where when, and reason for visit? no       2. Have seen or consulted any other health care provider since your last visit? If yes, where when, and reason for visit? NO      An electronic signature was used to authenticate this note.   -- Antwan Dawson LPN

## 2022-01-20 NOTE — Clinical Note
Reta,  I saw your patient Mr. Boy Ma for virtual appointment today. He has COVID and strep throat. He did a home test yesterday that was positive. I advised him to try to get a test possibly better med or urgent care. He is having some shortness of breath and would be a candidate for the monoclonal antibodies. I am unable to do the paperwork since I am not in the office. Please contact this patient to to try to get him scheduled for the antibody treatment.   Thanks,  Zoe Oconnell

## 2022-01-20 NOTE — PROGRESS NOTES
Iza Carrasquillo is a 43 y.o. male who was seen by synchronous (real-time) audio-video technology on 1/20/2022 for Sore Throat (white patches on the back of his throat. about 5 days. ) and Positive For Covid-19 (home test yesterday. )        Assessment & Plan:   Diagnoses and all orders for this visit:    1. Strep throat  -     azithromycin (ZITHROMAX) 250 mg tablet; Take 1 Tablet by mouth See Admin Instructions for 5 days. 2. COVID-19  -     azithromycin (ZITHROMAX) 250 mg tablet; Take 1 Tablet by mouth See Admin Instructions for 5 days. -     benzonatate (TESSALON) 200 mg capsule; Take 1 Capsule by mouth three (3) times daily as needed for Cough for up to 7 days. Strep/ COVID: Due to this patient's history of diabetes and morbid obesity I recommend a PCR test.  He would be an appropriate candidate for the monoclonal antibody treatment. I will send a message to his PCP and her team to set him up for the infusion. I recommend the pt to stay well hydrated. I prescribed a z-pack for the strep throat and Tessalon  for the cough. Subjective:     Mr. Maye Smiley is a pt of Dr. Rashmi Negrete. He presents with a chief complaint of a sore throat since Friday with white patches. He is noted to be carrier for strep. He has drainage, intermittent cough, and a stuffy nose. He does not report a fever. He has tested positive for COVID through a home test. He having SOB and inconsistent body temperatures. He has no body aches. He has both vaccines from [facility]. He does not report a history of Asthma. Prior to Admission medications    Medication Sig Start Date End Date Taking? Authorizing Provider   azithromycin (ZITHROMAX) 250 mg tablet Take 1 Tablet by mouth See Admin Instructions for 5 days. 1/20/22 1/25/22 Yes Antoinette Silva MD   benzonatate (TESSALON) 200 mg capsule Take 1 Capsule by mouth three (3) times daily as needed for Cough for up to 7 days.  1/20/22 1/27/22 Yes Antoinette Silva MD   simvastatin (ZOCOR) 40 mg tablet TAKE 1 TABLET BY MOUTH EVERY DAY AT NIGHT 12/7/21  Yes Ubaldo Espinosa MD   sertraline (ZOLOFT) 50 mg tablet Take 1 Tablet by mouth daily. 12/6/21  Yes Ubaldo Espinosa MD   metFORMIN (GLUCOPHAGE) 500 mg tablet TAKE 2 TABLETS BY MOUTH TWICE A DAY WITH MEALS 10/19/21  Yes Reta Matthew MD   sildenafiL, pulmonary hypertension, (REVATIO) 20 mg tablet Take 1 Tablet by mouth daily as needed (erectile dysfunction). 10/18/21  Yes Ubaldo Espinosa MD   semaglutide (Ozempic) 0.25 mg or 0.5 mg/dose (2 mg/1.5 ml) subq pen 0.25 mg by SubCUTAneous route every seven (7) days. 10/18/21  Yes MD Jude Avalos U-100 Insulin 100 unit/mL (3 mL) inpn INJECT 46 UNITS UNDER THE SKIN DAILY INDICATIONS: TYPE 2 DIABETES MELLITUS 10/6/21  Yes Ubaldo Espinosa MD   metFORMIN ER (GLUCOPHAGE XR) 500 mg tablet TAKE 2 TABLETS BY MOUTH TWICE A DAY AFTER MEALS 8/27/21  Yes Ubaldo Espinosa MD     Patient Active Problem List    Diagnosis Date Noted    Obesity, morbid ([facility] [address] 07/30/2019    Type 2 diabetes mellitus (HCC)     Erectile dysfunction      Current Outpatient Medications   Medication Sig Dispense Refill    azithromycin (ZITHROMAX) 250 mg tablet Take 1 Tablet by mouth See Admin Instructions for 5 days. 6 Tablet 0    benzonatate (TESSALON) 200 mg capsule Take 1 Capsule by mouth three (3) times daily as needed for Cough for up to 7 days. 21 Capsule 0    simvastatin (ZOCOR) 40 mg tablet TAKE 1 TABLET BY MOUTH EVERY DAY AT NIGHT 90 Tablet 2    sertraline (ZOLOFT) 50 mg tablet Take 1 Tablet by mouth daily. 90 Tablet 1    metFORMIN (GLUCOPHAGE) 500 mg tablet TAKE 2 TABLETS BY MOUTH TWICE A DAY WITH MEALS 360 Tablet 0    sildenafiL, pulmonary hypertension, (REVATIO) 20 mg tablet Take 1 Tablet by mouth daily as needed (erectile dysfunction). 90 Tablet 1    semaglutide (Ozempic) 0.25 mg or 0.5 mg/dose (2 mg/1.5 ml) subq pen 0.25 mg by SubCUTAneous route every seven (7) days.  [address] KwikPen U-100 Insulin 100 unit/mL (3 mL) inpn INJECT 46 UNITS UNDER THE SKIN DAILY INDICATIONS: TYPE 2 DIABETES MELLITUS 15 Pen 4    metFORMIN ER (GLUCOPHAGE XR) 500 mg tablet TAKE 2 TABLETS BY MOUTH TWICE A DAY AFTER MEALS 360 Tablet 1     No Known Allergies  Past Medical History:   Diagnosis Date    Erectile dysfunction     Type 2 diabetes mellitus (HCC)      Past Surgical History:   Procedure Laterality Date    HX VASECTOMY       Family History   Problem Relation Age of Onset    Diabetes Maternal Grandmother     Diabetes Maternal Grandfather     Diabetes Paternal Grandmother     Diabetes Paternal Grandfather      Social History     Tobacco Use    Smoking status: Never Smoker    Smokeless tobacco: Never Used   Substance Use Topics    Alcohol use: Yes       Review of Systems   Constitutional: Positive for chills and malaise/fatigue. HENT: Positive for congestion and sore throat. Eyes: Negative. Respiratory: Positive for cough and shortness of breath. Cardiovascular: Negative. Gastrointestinal: Negative. Genitourinary: Negative. Musculoskeletal: Negative. Skin: Negative. Neurological: Negative. Psychiatric/Behavioral: Negative.         Objective:     Patient-Reported Vitals 8/12/2021   Patient-Reported Weight 305lb        [INSTRUCTIONS:  \"[x]\" Indicates a positive item  \"[]\" Indicates a negative item  -- DELETE ALL ITEMS NOT EXAMINED]    Constitutional: [x] Appears well-developed and well-nourished [x] No apparent distress      [] Abnormal -     Mental status: [x] Alert and awake  [x] Oriented to person/place/time [x] Able to follow commands    [] Abnormal -     Eyes:   EOM    [x]  Normal    [] Abnormal -   Sclera  [x]  Normal    [] Abnormal -          Discharge [x]  None visible   [] Abnormal -     HENT: [x] Normocephalic, atraumatic  [] Abnormal -   [x] Mouth/Throat: Mucous membranes are moist    External Ears [x] Normal  [] Abnormal -    Neck: [x] No visualized mass [] Abnormal -     Pulmonary/Chest: [x] Respiratory effort normal   [x] No visualized signs of difficulty breathing or respiratory distress        [] Abnormal -      Musculoskeletal:   [x] Normal gait with no signs of ataxia         [x] Normal range of motion of neck        [] Abnormal -     Neurological:        [x] No Facial Asymmetry (Cranial nerve 7 motor function) (limited exam due to video visit)          [x] No gaze palsy        [] Abnormal -          Skin:        [x] No significant exanthematous lesions or discoloration noted on facial skin         [] Abnormal -            Psychiatric:       [x] Normal Affect [] Abnormal -        [x] No Hallucinations    Other pertinent observable physical exam findings:-        We discussed the expected course, resolution and complications of the diagnosis(es) in detail. Medication risks, benefits, costs, interactions, and alternatives were discussed as indicated. I advised him to contact the office if his condition worsens, changes or fails to improve as anticipated. He expressed understanding with the diagnosis(es) and plan. Erica Lewis, was evaluated through a synchronous (real-time) audio-video encounter. The patient (or guardian if applicable) is aware that this is a billable service, which includes applicable co-pays. Verbal consent to proceed has been obtained. The visit was conducted pursuant to the emergency declaration under the [address] [place] [address] authority and the [employer] General Act. Patient identification was verified, and a caregiver was present when appropriate. The patient was located at home in a state where the provider was licensed to provide care.       Krupa Cristina

## 2022-01-21 ENCOUNTER — PATIENT MESSAGE (OUTPATIENT)
Dept: INTERNAL MEDICINE CLINIC | Age: 37
End: 2022-01-21

## 2022-01-21 DIAGNOSIS — U07.1 COVID-19: Primary | ICD-10-CM

## 2022-01-21 RX ORDER — ALBUTEROL SULFATE 90 UG/1
1 AEROSOL, METERED RESPIRATORY (INHALATION)
Qty: 18 G | Refills: 1 | Status: SHIPPED | OUTPATIENT
Start: 2022-01-21 | End: 2022-03-25 | Stop reason: ALTCHOICE

## 2022-01-21 NOTE — TELEPHONE ENCOUNTER
Tramaine Barron 1/21/2022 2:24 PM EST      ----- Message -----  From: Estelita Rosa  Sent: 1/21/2022 12:44 PM EST  To: [id number]  Subject: Neisha Leonard,    I just wanted to let you know I got the test results back. It would appear I did test positive. I have some shortness of breath but nothing that seems major to me.

## 2022-02-20 DIAGNOSIS — N52.9 ERECTILE DYSFUNCTION, UNSPECIFIED ERECTILE DYSFUNCTION TYPE: ICD-10-CM

## 2022-02-21 ENCOUNTER — TELEPHONE (OUTPATIENT)
Dept: INTERNAL MEDICINE CLINIC | Age: 37
End: 2022-02-21

## 2022-02-21 RX ORDER — SILDENAFIL CITRATE 20 MG/1
20 TABLET ORAL
Qty: 90 TABLET | Refills: 1 | Status: SHIPPED | OUTPATIENT
Start: 2022-02-21 | End: 2022-07-20 | Stop reason: SDUPTHER

## 2022-02-21 NOTE — TELEPHONE ENCOUNTER
----- Message from Aimee Ness sent at 2/21/2022  3:49 PM EST -----  Subject: Appointment Request    Reason for Call: Routine Existing Condition Follow Up (Diabetes)    QUESTIONS  Type of Appointment? Established Patient  Reason for appointment request? No appointments available during search  Additional Information for Provider? Patient request call back. would like   to make follow up appointment for diabetes and ear pain. Mid march   appointment preferred  ---------------------------------------------------------------------------  --------------  CALL BACK INFO  What is the best way for the office to contact you? OK to leave message on   voicemail  Preferred Call Back Phone Number? [phone]  ---------------------------------------------------------------------------  --------------  SCRIPT ANSWERS  Relationship to Patient? Self  Is this follow up request related to routine Diabetes Management? Yes  Have you been diagnosed with, awaiting test results for, or told that you   are suspected of having COVID-19 (Coronavirus)? (If patient has tested   negative or was tested as a requirement for work, school, or travel and   not based on symptoms, answer no)? No  Within the past 10 days have you developed any of the following symptoms   (answer no if symptoms have been present longer than 10 days or began   more than 10 days ago)? Fever or Chills, Cough, Shortness of breath or   difficulty breathing, Loss of taste or smell, Sore throat, Nasal   congestion, Sneezing or runny nose, Fatigue or generalized body aches   (answer no if pain is specific to a body part e.g. back pain), Diarrhea,   Headache? No  Have you had close contact with someone with COVID-19 in the last 7 days? No  (Service Expert  click yes below to proceed with [employer] As Usual   Scheduling)?  Yes

## 2022-02-21 NOTE — TELEPHONE ENCOUNTER
Future Appointments:  No future appointments. Last Appointment With Me:  Visit date not found     Requested Prescriptions     Pending Prescriptions Disp Refills    sildenafiL (REVATIO) 20 mg tablet 90 Tablet 1     Sig: Take 1 Tablet by mouth daily as needed (erectile dysfunction).

## 2022-03-09 ENCOUNTER — PATIENT MESSAGE (OUTPATIENT)
Dept: INTERNAL MEDICINE CLINIC | Age: 37
End: 2022-03-09

## 2022-03-09 DIAGNOSIS — E11.9 TYPE 2 DIABETES MELLITUS WITHOUT COMPLICATION, UNSPECIFIED WHETHER LONG TERM INSULIN USE (HCC): Primary | ICD-10-CM

## 2022-03-09 RX ORDER — PEN NEEDLE, DIABETIC 31 GX3/16"
NEEDLE, DISPOSABLE MISCELLANEOUS
Qty: 100 PEN NEEDLE | Refills: 0 | Status: SHIPPED | OUTPATIENT
Start: 2022-03-09 | End: 2022-07-25 | Stop reason: ALTCHOICE

## 2022-03-09 NOTE — TELEPHONE ENCOUNTER
PCP: Ny Eric MD    Last appt: 1/20/2022  Future Appointments   Date Time Provider Damaris Carmen   3/25/2022 11:15 AM Appa Renee Flor MD [facility] BS AMB       Requested Prescriptions     Pending Prescriptions Disp Refills    Insulin Needles, Disposable, (BD Lottie 2nd Gen Pen Needle) 32 gauge x 5/32\" ndle 100 Pen Needle 0     Sig: USE DAILY WITH BASAGLAR INSULIN

## 2022-03-09 NOTE — TELEPHONE ENCOUNTER
From: Nicolette Sidhu  To: Kavin Arciniega MD  Sent: 3/9/2022 10:42 AM EST  Subject: Needles    Hello,    Im not sure if I need a script or not. However I need the needles for my injectable pens. I use to have several boxes but I am currently out.

## 2022-03-19 PROBLEM — E66.01 OBESITY, MORBID (HCC): Status: ACTIVE | Noted: 2019-07-30

## 2022-03-25 ENCOUNTER — OFFICE VISIT (OUTPATIENT)
Dept: INTERNAL MEDICINE CLINIC | Age: 37
End: 2022-03-25
Payer: COMMERCIAL

## 2022-03-25 VITALS
HEIGHT: 68 IN | RESPIRATION RATE: 16 BRPM | HEART RATE: 90 BPM | TEMPERATURE: 97.5 F | SYSTOLIC BLOOD PRESSURE: 119 MMHG | OXYGEN SATURATION: 99 % | WEIGHT: 297 LBS | DIASTOLIC BLOOD PRESSURE: 81 MMHG | BODY MASS INDEX: 45.01 KG/M2

## 2022-03-25 DIAGNOSIS — F41.9 ANXIETY: ICD-10-CM

## 2022-03-25 DIAGNOSIS — E11.9 CONTROLLED TYPE 2 DIABETES MELLITUS WITHOUT COMPLICATION, WITH LONG-TERM CURRENT USE OF INSULIN (HCC): Primary | ICD-10-CM

## 2022-03-25 DIAGNOSIS — E66.01 CLASS 3 SEVERE OBESITY DUE TO EXCESS CALORIES WITH SERIOUS COMORBIDITY AND BODY MASS INDEX (BMI) OF 45.0 TO 49.9 IN ADULT (HCC): ICD-10-CM

## 2022-03-25 DIAGNOSIS — Z79.4 CONTROLLED TYPE 2 DIABETES MELLITUS WITHOUT COMPLICATION, WITH LONG-TERM CURRENT USE OF INSULIN (HCC): Primary | ICD-10-CM

## 2022-03-25 DIAGNOSIS — Z23 ENCOUNTER FOR IMMUNIZATION: ICD-10-CM

## 2022-03-25 PROCEDURE — 90715 TDAP VACCINE 7 YRS/> IM: CPT | Performed by: INTERNAL MEDICINE

## 2022-03-25 PROCEDURE — 99214 OFFICE O/P EST MOD 30 MIN: CPT | Performed by: INTERNAL MEDICINE

## 2022-03-25 PROCEDURE — 90471 IMMUNIZATION ADMIN: CPT | Performed by: INTERNAL MEDICINE

## 2022-03-25 NOTE — PROGRESS NOTES
Mr. Neno Locke is presenting to follow up     CC:  Follow-up, Diabetes, and Ear Fullness (Right)       HPI:      He is a 43 y.o. male who presents for evaluation of diabetes       Type 2 diabetes: Currently on 46 units of Tresiba and 2000 mg of Metformin daily. He reports following a low sugar diet. He is ozempic   Going to gym for one hour 5 days a week  Lost 20 lbs intentional     Eating low carb and low sugar diet  Denies vision changes and neuropathy  - on lipitor since Dec 2019     High cholesterol: on lipitor   Tolerating it well  Denies muscle aches    Anxiety previously was started on Zoloft 50 mg he noted an improvement in depression. Lately does have down moments \" I am handling it\"  Has family        Review of systems:  Constitutional: negative for fever, chills, weight loss, night sweats   10 systems reviewed and negative other then HPI    Past Medical History:   Diagnosis Date    Erectile dysfunction     Type 2 diabetes mellitus (HCC)         Past Surgical History:   Procedure Laterality Date    HX VASECTOMY         No Known Allergies    Current Outpatient Medications on File Prior to Visit   Medication Sig Dispense Refill    Insulin Needles, Disposable, (BD Lottie 2nd Gen Pen Needle) 32 gauge x 5/32\" ndle USE DAILY WITH BASAGLAR INSULIN 100 Pen Needle 0    metFORMIN (GLUCOPHAGE) 500 mg tablet TAKE 2 TABLETS BY MOUTH TWICE A DAY WITH MEALS 360 Tablet 1    sildenafiL (REVATIO) 20 mg tablet Take 1 Tablet by mouth daily as needed (erectile dysfunction). 90 Tablet 1    simvastatin (ZOCOR) 40 mg tablet TAKE 1 TABLET BY MOUTH EVERY DAY AT NIGHT 90 Tablet 2    sertraline (ZOLOFT) 50 mg tablet Take 1 Tablet by mouth daily. 90 Tablet 1    semaglutide (Ozempic) 0.25 mg or 0.5 mg/dose (2 mg/1.5 ml) subq pen 0.25 mg by SubCUTAneous route every seven (7) days.  1 Box 2    Basaglar KwikPen U-100 Insulin 100 unit/mL (3 mL) inpn INJECT 46 UNITS UNDER THE SKIN DAILY INDICATIONS: TYPE 2 DIABETES MELLITUS 15 Pen 4    albuterol (PROVENTIL HFA, VENTOLIN HFA, PROAIR HFA) 90 mcg/actuation inhaler Take 1 Puff by inhalation every six (6) hours as needed for Wheezing. (Patient not taking: Reported on 3/25/2022) 18 g 1     No current facility-administered medications on file prior to visit. family history includes Diabetes in his maternal grandfather, maternal grandmother, paternal grandfather, and paternal grandmother. Social History     Socioeconomic History    Marital status:      Spouse name: Not on file    Number of children: Not on file    Years of education: Not on file    Highest education level: Not on file   Occupational History    Not on file   Tobacco Use    Smoking status: Never Smoker    Smokeless tobacco: Never Used   Vaping Use    Vaping Use: Never used   Substance and Sexual Activity    Alcohol use: Yes    Drug use: Never    Sexual activity: Yes     Partners: Female   Other Topics Concern    Not on file   Social History Narrative    Not on file     Social Determinants of Health     Financial Resource Strain:     Difficulty of Paying Living Expenses: Not on file   Food Insecurity:     Worried About Running Out of Food in the Last Year: Not on file    Tony of Food in the Last Year: Not on file   Transportation Needs:     Lack of Transportation (Medical): Not on file    Lack of Transportation (Non-Medical):  Not on file   Physical Activity:     Days of Exercise per Week: Not on file    Minutes of Exercise per Session: Not on file   Stress:     Feeling of Stress : Not on file   Social Connections:     Frequency of Communication with Friends and Family: Not on file    Frequency of Social Gatherings with Friends and Family: Not on file    Attends Druze Services: Not on file    Active Member of Clubs or Organizations: Not on file    Attends Club or Organization Meetings: Not on file    Marital Status: Not on file   Intimate Partner Violence:     Fear of Current or Ex-Partner: Not on file    Emotionally Abused: Not on file    Physically Abused: Not on file    Sexually Abused: Not on file   Housing Stability:     Unable to Pay for Housing in the Last Year: Not on file    Number of [place] in the Last Year: Not on file    Unstable Housing in the Last Year: Not on file       Visit Vitals  /81   Pulse 90   Temp 97.5 °F (36.4 °C) (Temporal)   Resp 16   Ht 5' 8\" (1.727 m)   Wt 297 lb (134.7 kg)   SpO2 99%   BMI 45.16 kg/m²     General:  Well appearing male no acute distress  HEENT:   PERRL,normal conjunctiva. External ear and canals normal, TMs normal.  Hearing normal to voice. Nose without edema or discharge, normal septum. Lips, teeth, gums normal.  Oropharynx: no erythema, no exudates, no lesions, normal tongue. Neck:  Supple. Thyroid normal size, nontender, without nodules. No carotid bruit. No masses or lymphadenopathy  Respiratory: no respiratory distress,  no wheezing, no rhonchi, no rales. No chest wall tenderness. Cardiovascular:  RRR, normal S1S2, no murmur. Gastrointestinal: normal bowel sounds, soft, nontender, without masses. Extremities +2 pulses, no edema, normal sensation   Musculoskeletal:  Normal gait. Normal digits and nails. Normal strength and tone, no atrophy, and no abnormal movement. Skin:  No rash, no lesions, no ulcers. Skin warm, normal turgor, without induration or nodules. Neuro:  A and OX4, fluent speech, cranial nerves normal 2-12. Sensation normal to light touch.   DTR symmetrical  Psych:  Normal affect        Monofilament R - normal sensation with micro filament  L - normal sensation with micro filament   Pulse DP R - 2+ (normal)  L - 2+ (normal)   Pulse TP R - 2+ (normal)  L - 2+ (normal)   Structural deformity R - None  L - None   Skin Integrity / Deformity R - None  L - None     Lab Results   Component Value Date/Time    WBC 7.2 08/13/2021 08:36 AM    HGB 14.2 08/13/2021 08:36 AM    HCT 42.0 08/13/2021 08:36 AM PLATELET 770 77/82/2909 08:36 AM    MCV 89.6 08/13/2021 08:36 AM     Lab Results   Component Value Date/Time    Sodium 138 08/13/2021 08:36 AM    Potassium 4.3 08/13/2021 08:36 AM    Chloride 106 08/13/2021 08:36 AM    CO2 26 08/13/2021 08:36 AM    Anion gap 6 08/13/2021 08:36 AM    Glucose 122 (H) 08/13/2021 08:36 AM    BUN 14 08/13/2021 08:36 AM    Creatinine 0.83 08/13/2021 08:36 AM    BUN/Creatinine ratio 17 08/13/2021 08:36 AM    GFR est AA >60 08/13/2021 08:36 AM    GFR est non-AA >60 08/13/2021 08:36 AM    Calcium 8.9 08/13/2021 08:36 AM     Lab Results   Component Value Date/Time    Cholesterol, total 152 08/13/2021 08:36 AM    HDL Cholesterol 38 08/13/2021 08:36 AM    LDL, calculated 90.6 08/13/2021 08:36 AM    VLDL, calculated 23.4 08/13/2021 08:36 AM    Triglyceride 117 08/13/2021 08:36 AM    CHOL/HDL Ratio 4.0 08/13/2021 08:36 AM     Lab Results   Component Value Date/Time    TSH 2.37 08/13/2021 08:36 AM     Lab Results   Component Value Date/Time    Hemoglobin A1c 6.5 (H) 08/13/2021 08:36 AM     No results found for: VITD3, Noralyn Mince, XQVID, VD3RIA                Assessment and Plan:     1. Controlled type 2 diabetes mellitus without complication, with long-term current use of insulin ([facility]  Making progress on insulin, ozempic and metformin. Plan is to possibly decrease insulin dose currently on 46 units  Up to date on eye exam  - HEMOGLOBIN A1C WITH EAG; Future  - METABOLIC PANEL, COMPREHENSIVE; Future  - LIPID PANEL; Future  - CBC WITH AUTOMATED DIFF; Future  - CBC WITH AUTOMATED DIFF  - LIPID PANEL  - METABOLIC PANEL, COMPREHENSIVE  - HEMOGLOBIN A1C WITH EAG    2. Anxiety  Doing ok on zoloft 50mg daily    3. Class 3 severe obesity due to excess calories with serious comorbidity and body mass index (BMI) of 45.0 to 49.9 in adult ([address]  Loosing weight has lost over 20lbs congratulated patient     4.  Encounter for immunization  - TETANUS, DIPHTHERIA TOXOIDS AND ACELLULAR PERTUSSIS VACCINE (TDAP), IN INDIVIDS. >=7, IM      Follow-up and Dispositions    · Return in about 3 months (around 6/25/2022) for diabetes .           Jumana Vasquez MD

## 2022-03-25 NOTE — PROGRESS NOTES
1. \"Have you been to the ER, urgent care clinic since your last visit? Hospitalized since your last visit? \" No    2. \"Have you seen or consulted any other health care providers outside of the [address] [place] [address] since your last visit? \" No     3. For patients aged 39-70: Has the patient had a colonoscopy / FIT/ Cologuard? NA - based on age      If the patient is female:    4. For patients aged 41-77: Has the patient had a mammogram within the past 2 years? NA - based on age or sex      11. For patients aged 21-65: Has the patient had a pap smear?  NA - based on age or sex

## 2022-03-26 LAB
ALBUMIN SERPL-MCNC: 4.2 G/DL (ref 3.5–5)
ALBUMIN/GLOB SERPL: 1.2 {RATIO} (ref 1.1–2.2)
ALP SERPL-CCNC: 105 U/L (ref 45–117)
ALT SERPL-CCNC: 88 U/L (ref 12–78)
ANION GAP SERPL CALC-SCNC: 5 MMOL/L (ref 5–15)
AST SERPL-CCNC: 43 U/L (ref 15–37)
BASOPHILS # BLD: 0.1 K/UL (ref 0–0.1)
BASOPHILS NFR BLD: 1 % (ref 0–1)
BILIRUB SERPL-MCNC: 1.1 MG/DL (ref 0.2–1)
BUN SERPL-MCNC: 10 MG/DL (ref 6–20)
BUN/CREAT SERPL: 12 (ref 12–20)
CALCIUM SERPL-MCNC: 9.5 MG/DL (ref 8.5–10.1)
CHLORIDE SERPL-SCNC: 106 MMOL/L (ref 97–108)
CHOLEST SERPL-MCNC: 150 MG/DL
CO2 SERPL-SCNC: 25 MMOL/L (ref 21–32)
CREAT SERPL-MCNC: 0.85 MG/DL (ref 0.7–1.3)
DIFFERENTIAL METHOD BLD: NORMAL
EOSINOPHIL # BLD: 0.1 K/UL (ref 0–0.4)
EOSINOPHIL NFR BLD: 2 % (ref 0–7)
ERYTHROCYTE [DISTWIDTH] IN BLOOD BY AUTOMATED COUNT: 12.7 % (ref 11.5–14.5)
EST. AVERAGE GLUCOSE BLD GHB EST-MCNC: 120 MG/DL
GLOBULIN SER CALC-MCNC: 3.6 G/DL (ref 2–4)
GLUCOSE SERPL-MCNC: 89 MG/DL (ref 65–100)
HBA1C MFR BLD: 5.8 % (ref 4–5.6)
HCT VFR BLD AUTO: 46.3 % (ref 36.6–50.3)
HDLC SERPL-MCNC: 42 MG/DL
HDLC SERPL: 3.6 {RATIO} (ref 0–5)
HGB BLD-MCNC: 14.6 G/DL (ref 12.1–17)
IMM GRANULOCYTES # BLD AUTO: 0 K/UL (ref 0–0.04)
IMM GRANULOCYTES NFR BLD AUTO: 0 % (ref 0–0.5)
LDLC SERPL CALC-MCNC: 85.2 MG/DL (ref 0–100)
LYMPHOCYTES # BLD: 2 K/UL (ref 0.8–3.5)
LYMPHOCYTES NFR BLD: 25 % (ref 12–49)
MCH RBC QN AUTO: 30.8 PG (ref 26–34)
MCHC RBC AUTO-ENTMCNC: 31.5 G/DL (ref 30–36.5)
MCV RBC AUTO: 97.7 FL (ref 80–99)
MONOCYTES # BLD: 0.5 K/UL (ref 0–1)
MONOCYTES NFR BLD: 7 % (ref 5–13)
NEUTS SEG # BLD: 5.3 K/UL (ref 1.8–8)
NEUTS SEG NFR BLD: 65 % (ref 32–75)
NRBC # BLD: 0 K/UL (ref 0–0.01)
NRBC BLD-RTO: 0 PER 100 WBC
PLATELET # BLD AUTO: 323 K/UL (ref 150–400)
PMV BLD AUTO: 10 FL (ref 8.9–12.9)
POTASSIUM SERPL-SCNC: 4.3 MMOL/L (ref 3.5–5.1)
PROT SERPL-MCNC: 7.8 G/DL (ref 6.4–8.2)
RBC # BLD AUTO: 4.74 M/UL (ref 4.1–5.7)
SODIUM SERPL-SCNC: 136 MMOL/L (ref 136–145)
TRIGL SERPL-MCNC: 114 MG/DL (ref ?–150)
VLDLC SERPL CALC-MCNC: 22.8 MG/DL
WBC # BLD AUTO: 8 K/UL (ref 4.1–11.1)

## 2022-03-28 NOTE — PROGRESS NOTES
Hemoglobin A1C is 5.8 which is in prediabetic range recommend that you lower insulin tresiba to 36 units and let me know how your sugars are doing in 2 weeks. If you continue with healthy diet the ozempic and metformin there is a chance of weaning off of insulin.    Cholesterol looks good and kidney and liver function are normal

## 2022-05-15 RX ORDER — SERTRALINE HYDROCHLORIDE 50 MG/1
TABLET, FILM COATED ORAL
Qty: 90 TABLET | Refills: 1 | Status: SHIPPED | OUTPATIENT
Start: 2022-05-15 | End: 2022-07-20 | Stop reason: SDUPTHER

## 2022-05-27 ENCOUNTER — PATIENT MESSAGE (OUTPATIENT)
Dept: INTERNAL MEDICINE CLINIC | Age: 37
End: 2022-05-27

## 2022-05-27 DIAGNOSIS — R11.14 BILIOUS VOMITING WITH NAUSEA: Primary | ICD-10-CM

## 2022-05-27 RX ORDER — ONDANSETRON 4 MG/1
4 TABLET, ORALLY DISINTEGRATING ORAL
Qty: 20 TABLET | Refills: 1 | Status: SHIPPED | OUTPATIENT
Start: 2022-05-27

## 2022-06-10 NOTE — TELEPHONE ENCOUNTER
Harrishart, Generic 6/10/2022 11:26 AM EDT    My last dose of the Ozempic was on the 23rd of May and it was less than the .25 mg.  When I do vomit its clear liquid never anything substantial.

## 2022-06-29 ENCOUNTER — PATIENT MESSAGE (OUTPATIENT)
Dept: INTERNAL MEDICINE CLINIC | Age: 37
End: 2022-06-29

## 2022-06-29 ENCOUNTER — HOSPITAL ENCOUNTER (OUTPATIENT)
Dept: ULTRASOUND IMAGING | Age: 37
Discharge: HOME | End: 2022-06-29
Attending: INTERNAL MEDICINE
Payer: COMMERCIAL

## 2022-06-29 DIAGNOSIS — R11.14 BILIOUS VOMITING WITH NAUSEA: ICD-10-CM

## 2022-06-29 DIAGNOSIS — R11.2 NAUSEA AND VOMITING, UNSPECIFIED VOMITING TYPE: Primary | ICD-10-CM

## 2022-06-29 PROCEDURE — 76705 ECHO EXAM OF ABDOMEN: CPT

## 2022-06-29 RX ORDER — PANTOPRAZOLE SODIUM 40 MG/1
40 TABLET, DELAYED RELEASE ORAL DAILY
Qty: 30 TABLET | Refills: 1 | Status: SHIPPED | OUTPATIENT
Start: 2022-06-29 | End: 2022-07-21

## 2022-06-29 NOTE — PROGRESS NOTES
Fatty liver which is an infiltration of fat in the liver that improves with weight loss  The gallbladder looks normal  Did symptoms of nausea and vomiting improve?  How are your sugars??

## 2022-06-29 NOTE — TELEPHONE ENCOUNTER
Gerry Simpson 6/29/2022 12:56 PM EDT      ----- Message -----  From: Yeny Samaniego  Sent: 6/29/2022 11:51 AM EDT  To: [facility] Nurse Pool  Subject: Question regarding US ABDOMINAL LIMITED     Sugar is normal low of 80 to 108 fasting, 120 to 145 after eating depending on food. Nausea has not improved and is worse on an empty stomach.

## 2022-06-29 NOTE — ADDENDUM NOTE
Addended by: Lisle Hamburg, [facility] on: 6/29/2022 01:30 PM     Modules accepted: Orders

## 2022-06-29 NOTE — PROGRESS NOTES
Pt informed of test results. Pt states the N & V are worse, especially on a empty stomach.    Pt states is blood sugars are normal.

## 2022-07-05 ENCOUNTER — HOSPITAL ENCOUNTER (OUTPATIENT)
Dept: NUCLEAR MEDICINE | Age: 37
Discharge: HOME | End: 2022-07-05
Attending: INTERNAL MEDICINE
Payer: COMMERCIAL

## 2022-07-05 VITALS — WEIGHT: 300 LBS | BODY MASS INDEX: 45.61 KG/M2

## 2022-07-05 DIAGNOSIS — R11.2 NAUSEA AND VOMITING, UNSPECIFIED VOMITING TYPE: ICD-10-CM

## 2022-07-05 PROCEDURE — 74011250636 HC RX REV CODE- 250/636: Performed by: INTERNAL MEDICINE

## 2022-07-05 PROCEDURE — 78227 HEPATOBIL SYST IMAGE W/DRUG: CPT

## 2022-07-05 RX ORDER — KIT FOR THE PREPARATION OF TECHNETIUM TC 99M MEBROFENIN 45 MG/10ML
5.2 INJECTION, POWDER, LYOPHILIZED, FOR SOLUTION INTRAVENOUS
Status: COMPLETED | OUTPATIENT
Start: 2022-07-05 | End: 2022-07-05

## 2022-07-05 RX ADMIN — KIT FOR THE PREPARATION OF TECHNETIUM TC 99M MEBROFENIN 5.2 MILLICURIE: 45 INJECTION, POWDER, LYOPHILIZED, FOR SOLUTION INTRAVENOUS at 08:10

## 2022-07-05 RX ADMIN — SINCALIDE 2.72 MCG: 5 INJECTION, POWDER, LYOPHILIZED, FOR SOLUTION INTRAVENOUS at 09:00

## 2022-07-20 DIAGNOSIS — N52.9 ERECTILE DYSFUNCTION, UNSPECIFIED ERECTILE DYSFUNCTION TYPE: ICD-10-CM

## 2022-07-21 DIAGNOSIS — R11.2 NAUSEA AND VOMITING, UNSPECIFIED VOMITING TYPE: ICD-10-CM

## 2022-07-21 RX ORDER — SERTRALINE HYDROCHLORIDE 50 MG/1
50 TABLET, FILM COATED ORAL DAILY
Qty: 90 TABLET | Refills: 1 | Status: SHIPPED | OUTPATIENT
Start: 2022-07-21

## 2022-07-21 RX ORDER — SILDENAFIL CITRATE 20 MG/1
20 TABLET ORAL
Qty: 90 TABLET | Refills: 1 | Status: SHIPPED | OUTPATIENT
Start: 2022-07-21 | End: 2022-07-26 | Stop reason: SDUPTHER

## 2022-07-21 RX ORDER — PANTOPRAZOLE SODIUM 40 MG/1
TABLET, DELAYED RELEASE ORAL
Qty: 30 TABLET | Refills: 1 | Status: SHIPPED | OUTPATIENT
Start: 2022-07-21 | End: 2022-08-22

## 2022-07-21 NOTE — TELEPHONE ENCOUNTER
Future Appointments:  Future Appointments   Date Time Provider Damaris Carmen   7/25/2022  1:45 PM Appshelley Barrientos MD [facility] BS AMB        Last Appointment With Me:  3/25/2022     Requested Prescriptions     Pending Prescriptions Disp Refills    sildenafiL (REVATIO) 20 mg tablet 90 Tablet 1     Sig: Take 1 Tablet by mouth daily as needed (erectile dysfunction). sertraline (ZOLOFT) 50 mg tablet 90 Tablet 1     Sig: Take 1 Tablet by mouth in the morning.

## 2022-07-25 ENCOUNTER — OFFICE VISIT (OUTPATIENT)
Dept: INTERNAL MEDICINE CLINIC | Age: 37
End: 2022-07-25
Payer: COMMERCIAL

## 2022-07-25 VITALS
TEMPERATURE: 97.3 F | DIASTOLIC BLOOD PRESSURE: 79 MMHG | HEIGHT: 68 IN | HEART RATE: 84 BPM | WEIGHT: 305 LBS | SYSTOLIC BLOOD PRESSURE: 122 MMHG | OXYGEN SATURATION: 98 % | BODY MASS INDEX: 46.23 KG/M2 | RESPIRATION RATE: 18 BRPM

## 2022-07-25 DIAGNOSIS — Z79.4 CONTROLLED TYPE 2 DIABETES MELLITUS WITHOUT COMPLICATION, WITH LONG-TERM CURRENT USE OF INSULIN (HCC): ICD-10-CM

## 2022-07-25 DIAGNOSIS — R11.2 NAUSEA AND VOMITING, UNSPECIFIED VOMITING TYPE: ICD-10-CM

## 2022-07-25 DIAGNOSIS — R10.13 EPIGASTRIC PAIN: Primary | ICD-10-CM

## 2022-07-25 DIAGNOSIS — E11.9 CONTROLLED TYPE 2 DIABETES MELLITUS WITHOUT COMPLICATION, WITH LONG-TERM CURRENT USE OF INSULIN (HCC): ICD-10-CM

## 2022-07-25 DIAGNOSIS — H66.90 EAR INFECTION: ICD-10-CM

## 2022-07-25 DIAGNOSIS — Z20.2 STD EXPOSURE: ICD-10-CM

## 2022-07-25 PROCEDURE — 99214 OFFICE O/P EST MOD 30 MIN: CPT | Performed by: INTERNAL MEDICINE

## 2022-07-25 RX ORDER — CIPROFLOXACIN AND DEXAMETHASONE 3; 1 MG/ML; MG/ML
4 SUSPENSION/ DROPS AURICULAR (OTIC) 2 TIMES DAILY
Qty: 7.5 ML | Refills: 0 | Status: SHIPPED | OUTPATIENT
Start: 2022-07-25 | End: 2022-08-01

## 2022-07-25 NOTE — PROGRESS NOTES
1. \"Have you been to the ER, urgent care clinic since your last visit? Hospitalized since your last visit? \" No    2. \"Have you seen or consulted any other health care providers outside of the [address] [place] [address] since your last visit? \" No     3. For patients aged 39-70: Has the patient had a colonoscopy / FIT/ Cologuard? NA - based on age      If the patient is female:    4. For patients aged 41-77: Has the patient had a mammogram within the past 2 years? NA - based on age or sex      11. For patients aged 21-65: Has the patient had a pap smear?  NA - based on age or sex

## 2022-07-25 NOTE — PATIENT INSTRUCTIONS
Ordered gastric emptying to make sure you do not gastroparesis  If your test is normal then can resume the ozempic   And recommend gastroenterologist referral  Testing blood and urine today

## 2022-07-25 NOTE — PROGRESS NOTES
Mr. Nolan Milton is presenting to follow up     CC:  Follow-up, Diabetes, Depression, Ear Fullness (Right), and Exposure to STD       HPI:      He is a 43 y.o. male who presents for evaluation of diabetes and nausea  3 months a go started to have nausea and abdominal pain   Then stopped the ozempic and no real change in symptoms  Then patient had US of gallbladder and HYDA scan   Taking acid reflux pantoprazole and zofran   If eats more symptoms are worst   Was vomiting twice a week and now stopped  Pain is epigastric    Intentional weight loss 360 was highest and  currently on 305          Type 2 diabetes: ob  2000 mg of Metformin daily. He reports following a low sugar diet. He stopped ozempic due to above   Going to gym for one hour 5 days a week  Lost 40 lbs +   Off of insulin     Eating low carb and low sugar diet  Denies vision changes and neuropathy  - on lipitor since Dec 2019     High cholesterol: on lipitor   Tolerating it well  Denies muscle aches    Anxiety previously was started on Zoloft 50 mg he noted an improvement in depression. Girlfriend was not faithful and had STD trich and patient requesting testing  He is asymptomatic        Review of systems:  + right ear drainage and itching  Constitutional: negative for fever, chills, weight loss, night sweats   10 systems reviewed and negative other then HPI    Past Medical History:   Diagnosis Date    Erectile dysfunction     Type 2 diabetes mellitus ([facility] [address]         Past Surgical History:   Procedure Laterality Date    HX VASECTOMY         No Known Allergies    Current Outpatient Medications on File Prior to Visit   Medication Sig Dispense Refill    sildenafiL (REVATIO) 20 mg tablet Take 1 Tablet by mouth daily as needed (erectile dysfunction). 90 Tablet 1    sertraline (ZOLOFT) 50 mg tablet Take 1 Tablet by mouth in the morning.  90 Tablet 1    pantoprazole (PROTONIX) 40 mg tablet TAKE 1 TABLET BY MOUTH EVERY DAY 30 Tablet 1    ondansetron (ZOFRAN ODT) 4 mg disintegrating tablet Take 1 Tablet by mouth every eight (8) hours as needed for Nausea or Vomiting. 20 Tablet 1    metFORMIN (GLUCOPHAGE) 500 mg tablet TAKE 2 TABLETS BY MOUTH TWICE A DAY WITH MEALS 360 Tablet 1    simvastatin (ZOCOR) 40 mg tablet TAKE 1 TABLET BY MOUTH EVERY DAY AT NIGHT 90 Tablet 2    semaglutide (Ozempic) 0.25 mg or 0.5 mg/dose (2 mg/1.5 ml) subq pen 0.25 mg by SubCUTAneous route every seven (7) days. 1 Box 2    Insulin Needles, Disposable, (BD Lottie 2nd Gen Pen Needle) 32 gauge x 5/32\" ndle USE DAILY WITH BASAGLAR INSULIN 100 Pen Needle 0    Basaglar KwikPen U-100 Insulin 100 unit/mL (3 mL) inpn INJECT 46 UNITS UNDER THE SKIN DAILY INDICATIONS: TYPE 2 DIABETES MELLITUS (Patient not taking: Reported on 7/25/2022) 15 Pen 4     No current facility-administered medications on file prior to visit. family history includes Diabetes in his maternal grandfather, maternal grandmother, paternal grandfather, and paternal grandmother.     Social History     Socioeconomic History    Marital status:      Spouse name: Not on file    Number of children: Not on file    Years of education: Not on file    Highest education level: Not on file   Occupational History    Not on file   Tobacco Use    Smoking status: Never    Smokeless tobacco: Never   Vaping Use    Vaping Use: Never used   Substance and Sexual Activity    Alcohol use: Yes    Drug use: Never    Sexual activity: Yes     Partners: Female   Other Topics Concern    Not on file   Social History Narrative    Not on file     Social Determinants of Health     Financial Resource Strain: Not on file   Food Insecurity: Not on file   Transportation Needs: Not on file   Physical Activity: Not on file   Stress: Not on file   Social Connections: Not on file   Intimate Partner Violence: Not on file   Housing Stability: Not on file       Visit Vitals  /79   Pulse 84   Temp 97.3 °F (36.3 °C) (Temporal)   Resp 18   Ht 5' 8\" (1.727 m)   Wt 305 lb (138.3 kg)   SpO2 98%   BMI 46.38 kg/m²     General:  Well appearing male no acute distress  HEENT:   PERRL,normal conjunctiva. External ear right with redness and irritation and debris   Neck:  Supple. Thyroid normal size, nontender, without nodules. No carotid bruit. No masses or lymphadenopathy  Respiratory: no respiratory distress,  no wheezing, no rhonchi, no rales. No chest wall tenderness. Cardiovascular:  RRR, normal S1S2, no murmur. Gastrointestinal: normal bowel sounds, soft, + epigastric tenderness, without masses. Extremities +2 pulses, no edema, normal sensation   Musculoskeletal:  Normal gait. Normal digits and nails. Normal strength and tone, no atrophy, and no abnormal movement. Skin:  No rash, no lesions, no ulcers. Skin warm, normal turgor, without induration or nodules.     Psych:  Normal affect        Lab Results   Component Value Date/Time    WBC 8.0 03/25/2022 12:31 PM    HGB 14.6 03/25/2022 12:31 PM    HCT 46.3 03/25/2022 12:31 PM    PLATELET 803 14/36/5903 12:31 PM    MCV 97.7 03/25/2022 12:31 PM     Lab Results   Component Value Date/Time    Sodium 136 03/25/2022 12:31 PM    Potassium 4.3 03/25/2022 12:31 PM    Chloride 106 03/25/2022 12:31 PM    CO2 25 03/25/2022 12:31 PM    Anion gap 5 03/25/2022 12:31 PM    Glucose 89 03/25/2022 12:31 PM    BUN 10 03/25/2022 12:31 PM    Creatinine 0.85 03/25/2022 12:31 PM    BUN/Creatinine ratio 12 03/25/2022 12:31 PM    GFR est AA >60 03/25/2022 12:31 PM    GFR est non-AA >60 03/25/2022 12:31 PM    Calcium 9.5 03/25/2022 12:31 PM     Lab Results   Component Value Date/Time    Cholesterol, total 150 03/25/2022 12:31 PM    HDL Cholesterol 42 03/25/2022 12:31 PM    LDL, calculated 85.2 03/25/2022 12:31 PM    VLDL, calculated 22.8 03/25/2022 12:31 PM    Triglyceride 114 03/25/2022 12:31 PM    CHOL/HDL Ratio 3.6 03/25/2022 12:31 PM     Lab Results   Component Value Date/Time    TSH 2.37 08/13/2021 08:36 AM     Lab Results   Component Value Date/Time    Hemoglobin A1c 5.8 (H) 03/25/2022 12:31 PM     No results found for: VITD3, Evan Magallanes, XQVID, VD3RIA                Assessment and Plan:     1. Epigastric pain    2. Nausea and vomiting, unspecified vomiting type  Normal US and hyda scan   Stopped ozempic and little improvement   - METABOLIC PANEL, COMPREHENSIVE; Future  - HEMOGLOBIN A1C WITH EAG; Future  - CBC WITH AUTOMATED DIFF; Future  - NM GASTRIC EMPTY STDY; Future  - REFERRAL TO GASTROENTEROLOGY  Continue PPI daily and PRIN zofran  3. STD exposure  asymptomatic  - HIV 1/2 AG/AB, 4TH GENERATION,W RFLX CONFIRM; Future  - RPR; Future  - CT/NG/T.VAGINALIS AMPLIFICATION; Future    4. Ear infection  - ciprofloxacin-dexamethasone (CIPRODEX) 0.3-0.1 % otic suspension; Administer 4 Drops in left ear two (2) times a day for 7 days. Dispense: 7.5 mL; Refill: 0    5.  Controlled type 2 diabetes mellitus without complication, with long-term current use of insulin (HCC)  On metformin only with diet and exercise lost 40 + lbs and off of insulin and ozempic  - MICROALBUMIN, UR, RAND W/ MICROALB/CREAT RATIO; Future          Marsha Torres MD

## 2022-07-26 ENCOUNTER — TELEPHONE (OUTPATIENT)
Dept: INTERNAL MEDICINE CLINIC | Age: 37
End: 2022-07-26

## 2022-07-26 DIAGNOSIS — N52.9 ERECTILE DYSFUNCTION, UNSPECIFIED ERECTILE DYSFUNCTION TYPE: ICD-10-CM

## 2022-07-26 LAB
ALBUMIN SERPL-MCNC: 4.2 G/DL (ref 3.5–5)
ALBUMIN/GLOB SERPL: 1.1 {RATIO} (ref 1.1–2.2)
ALP SERPL-CCNC: 100 U/L (ref 45–117)
ALT SERPL-CCNC: 153 U/L (ref 12–78)
ANION GAP SERPL CALC-SCNC: 5 MMOL/L (ref 5–15)
AST SERPL-CCNC: 60 U/L (ref 15–37)
BASOPHILS # BLD: 0 K/UL (ref 0–0.1)
BASOPHILS NFR BLD: 0 % (ref 0–1)
BILIRUB SERPL-MCNC: 1.3 MG/DL (ref 0.2–1)
BUN SERPL-MCNC: 16 MG/DL (ref 6–20)
BUN/CREAT SERPL: 18 (ref 12–20)
CALCIUM SERPL-MCNC: 9.5 MG/DL (ref 8.5–10.1)
CHLORIDE SERPL-SCNC: 104 MMOL/L (ref 97–108)
CO2 SERPL-SCNC: 25 MMOL/L (ref 21–32)
CREAT SERPL-MCNC: 0.91 MG/DL (ref 0.7–1.3)
CREAT UR-MCNC: 58.5 MG/DL
DIFFERENTIAL METHOD BLD: NORMAL
EOSINOPHIL # BLD: 0.1 K/UL (ref 0–0.4)
EOSINOPHIL NFR BLD: 1 % (ref 0–7)
ERYTHROCYTE [DISTWIDTH] IN BLOOD BY AUTOMATED COUNT: 12.3 % (ref 11.5–14.5)
EST. AVERAGE GLUCOSE BLD GHB EST-MCNC: 140 MG/DL
GLOBULIN SER CALC-MCNC: 3.9 G/DL (ref 2–4)
GLUCOSE SERPL-MCNC: 101 MG/DL (ref 65–100)
HBA1C MFR BLD: 6.5 % (ref 4–5.6)
HCT VFR BLD AUTO: 46.6 % (ref 36.6–50.3)
HGB BLD-MCNC: 15.4 G/DL (ref 12.1–17)
HIV 1+2 AB+HIV1 P24 AG SERPL QL IA: NONREACTIVE
HIV12 RESULT COMMENT, HHIVC: NORMAL
IMM GRANULOCYTES # BLD AUTO: 0 K/UL (ref 0–0.04)
IMM GRANULOCYTES NFR BLD AUTO: 0 % (ref 0–0.5)
LYMPHOCYTES # BLD: 2 K/UL (ref 0.8–3.5)
LYMPHOCYTES NFR BLD: 25 % (ref 12–49)
MCH RBC QN AUTO: 31.1 PG (ref 26–34)
MCHC RBC AUTO-ENTMCNC: 33 G/DL (ref 30–36.5)
MCV RBC AUTO: 94.1 FL (ref 80–99)
MICROALBUMIN UR-MCNC: <0.5 MG/DL
MICROALBUMIN/CREAT UR-RTO: <9 MG/G (ref 0–30)
MONOCYTES # BLD: 0.6 K/UL (ref 0–1)
MONOCYTES NFR BLD: 7 % (ref 5–13)
NEUTS SEG # BLD: 5.3 K/UL (ref 1.8–8)
NEUTS SEG NFR BLD: 67 % (ref 32–75)
NRBC # BLD: 0 K/UL (ref 0–0.01)
NRBC BLD-RTO: 0 PER 100 WBC
PLATELET # BLD AUTO: 310 K/UL (ref 150–400)
PMV BLD AUTO: 9.8 FL (ref 8.9–12.9)
POTASSIUM SERPL-SCNC: 4.2 MMOL/L (ref 3.5–5.1)
PROT SERPL-MCNC: 8.1 G/DL (ref 6.4–8.2)
RBC # BLD AUTO: 4.95 M/UL (ref 4.1–5.7)
RPR SER QL: NONREACTIVE
SODIUM SERPL-SCNC: 134 MMOL/L (ref 136–145)
WBC # BLD AUTO: 8 K/UL (ref 4.1–11.1)

## 2022-07-26 RX ORDER — SILDENAFIL CITRATE 20 MG/1
20 TABLET ORAL
Qty: 90 TABLET | Refills: 1 | Status: SHIPPED | OUTPATIENT
Start: 2022-07-26

## 2022-07-26 NOTE — TELEPHONE ENCOUNTER
----- Message from Arnie Kirk sent at 7/26/2022  9:32 AM EDT -----  Regarding: MEdciation  [facility] will be fine thank you.

## 2022-07-26 NOTE — TELEPHONE ENCOUNTER
PA submitted through cover my med for ciprodex otic susp.     Pending approval         Jesi Swenson      [id number]   Grp      [facility]   id [id number]    Em tong

## 2022-07-28 NOTE — PROGRESS NOTES
Normal blood count  HIV test negative recommend repeating in 6 months again since ti can take up to 6 months for seroconversion if exposed  Testing for syphilis is negative meaning normal   Diabetes control remains outstanding HA1C is 6.5 keep up good work

## 2022-07-29 LAB
C TRACH RRNA SPEC QL NAA+PROBE: NEGATIVE
N GONORRHOEA RRNA SPEC QL NAA+PROBE: NEGATIVE
SPECIMEN SOURCE: NORMAL
T VAGINALIS RRNA SPEC QL NAA+PROBE: NEGATIVE

## 2022-08-03 NOTE — TELEPHONE ENCOUNTER
Future Appointments:  No future appointments. Last Appointment With Me:  7/25/2022     Requested Prescriptions     Pending Prescriptions Disp Refills    simvastatin (ZOCOR) 40 mg tablet 90 Tablet 2     Sig: Take 1 Tablet by mouth nightly.

## 2022-08-04 RX ORDER — SIMVASTATIN 40 MG/1
40 TABLET, FILM COATED ORAL
Qty: 90 TABLET | Refills: 2 | Status: SHIPPED | OUTPATIENT
Start: 2022-08-04

## 2022-08-11 DIAGNOSIS — E11.65 UNCONTROLLED TYPE 2 DIABETES MELLITUS WITH HYPERGLYCEMIA (HCC): ICD-10-CM

## 2022-08-11 RX ORDER — METFORMIN HYDROCHLORIDE 500 MG/1
TABLET ORAL
Qty: 360 TABLET | Refills: 1 | Status: SHIPPED | OUTPATIENT
Start: 2022-08-11

## 2022-08-20 DIAGNOSIS — R11.2 NAUSEA AND VOMITING, UNSPECIFIED VOMITING TYPE: ICD-10-CM

## 2022-08-22 RX ORDER — PANTOPRAZOLE SODIUM 40 MG/1
TABLET, DELAYED RELEASE ORAL
Qty: 30 TABLET | Refills: 1 | Status: SHIPPED | OUTPATIENT
Start: 2022-08-22

## 2022-09-14 ENCOUNTER — PATIENT MESSAGE (OUTPATIENT)
Dept: INTERNAL MEDICINE CLINIC | Age: 37
End: 2022-09-14

## 2022-09-14 ENCOUNTER — TELEPHONE (OUTPATIENT)
Dept: INTERNAL MEDICINE CLINIC | Age: 37
End: 2022-09-14

## 2022-09-14 DIAGNOSIS — H66.90 EAR INFECTION: Primary | ICD-10-CM

## 2022-09-14 NOTE — TELEPHONE ENCOUNTER
Mamie Damon 9/14/2022 8:29 AM EDT      ----- Message -----  From: Leobardo Grimm  Sent: 9/14/2022 8:15 AM EDT  To: Eulogio Monson  Subject: Ear medication     Hello Dr Hesham Escalona    I was informed By [facility] that they have made several attempts to get a different prescription for the ear drops you wrote up for me. Is there anyway we can find another option for treatment of it?

## 2022-09-14 NOTE — TELEPHONE ENCOUNTER
Pippa//[facility] states she needs to get something else prescribed as  ciprofloxacin-hydrocortisone (CIPRO HC OTIC) otic suspension is No Longer available to even Order & also patient's insurance does not cover. Please call to discuss & advise on Alternatives.  Thank you

## 2022-09-15 RX ORDER — CIPROFLOXACIN HYDROCHLORIDE 3.5 MG/ML
SOLUTION/ DROPS TOPICAL
Qty: 5 ML | Refills: 0 | Status: SHIPPED | OUTPATIENT
Start: 2022-09-15

## 2022-09-16 ENCOUNTER — TELEPHONE (OUTPATIENT)
Dept: INTERNAL MEDICINE CLINIC | Age: 37
End: 2022-09-16

## 2022-09-16 NOTE — TELEPHONE ENCOUNTER
----- Message from Israelnymeme Bernal sent at 9/16/2022  9:15 AM EDT -----  Subject: Message to Provider    QUESTIONS  Information for Provider? Alberto with pharmacy needs to clarify   prescription for eye drops. How many times per day is this to be used? Please call.  ---------------------------------------------------------------------------  --------------  Bran Muse [facility]  [phone]; OK to leave message on voicemail  ---------------------------------------------------------------------------  --------------  SCRIPT ANSWERS  Relationship to Patient? Third Party  Third Party Type? Pharmacy? Representative Name?  Haylie Jalloh

## 2022-09-19 NOTE — TELEPHONE ENCOUNTER
Clarification order  I spoke to Pippa, Pharmacist, I  advised her per Dr. Crispin Staton the cipro drops are for the pt's ear, he is to use it 3 times a day. Pharmacist verbalized understanding of information discussed w/ no further questions at this time.   Signed By: Ken Spicer LPN     September 19, 2022

## 2022-09-22 ENCOUNTER — TELEPHONE (OUTPATIENT)
Dept: INTERNAL MEDICINE CLINIC | Age: 37
End: 2022-09-22

## 2022-09-22 NOTE — TELEPHONE ENCOUNTER
Sildenafil Citrate 20MG tablets       Status: PA Request    Created: September 21st, 2022 [phone]

## 2022-10-21 ENCOUNTER — PATIENT MESSAGE (OUTPATIENT)
Dept: INTERNAL MEDICINE CLINIC | Age: 37
End: 2022-10-21

## 2022-10-21 DIAGNOSIS — J02.0 STREP PHARYNGITIS: Primary | ICD-10-CM

## 2022-10-21 RX ORDER — AMOXICILLIN 500 MG/1
500 CAPSULE ORAL 2 TIMES DAILY
Qty: 20 CAPSULE | Refills: 0 | Status: SHIPPED | OUTPATIENT
Start: 2022-10-21

## 2022-10-21 NOTE — TELEPHONE ENCOUNTER
Bernie Galeano 10/21/2022 8:21 AM EDT      ----- Message -----  From: Horace Klein  Sent: 10/21/2022 7:05 AM EDT  To: Ciara Monson  Subject: Sore throat     Sandra Ross Friend    I was curious, if it was possible to get prescribe some antibiotics. I think I have strep, I have white patches in the back of my throat and I have taken two at home COVID test which came back negative.  Or do I need to make an appointment

## 2022-11-09 DIAGNOSIS — R11.2 NAUSEA AND VOMITING, UNSPECIFIED VOMITING TYPE: ICD-10-CM

## 2022-11-09 RX ORDER — PANTOPRAZOLE SODIUM 40 MG/1
TABLET, DELAYED RELEASE ORAL
Qty: 30 TABLET | Refills: 1 | Status: SHIPPED | OUTPATIENT
Start: 2022-11-09

## 2022-11-29 ENCOUNTER — OFFICE VISIT (OUTPATIENT)
Dept: INTERNAL MEDICINE CLINIC | Age: 37
End: 2022-11-29
Payer: COMMERCIAL

## 2022-11-29 VITALS
SYSTOLIC BLOOD PRESSURE: 109 MMHG | OXYGEN SATURATION: 97 % | DIASTOLIC BLOOD PRESSURE: 73 MMHG | WEIGHT: 302.8 LBS | RESPIRATION RATE: 16 BRPM | HEIGHT: 68 IN | BODY MASS INDEX: 45.89 KG/M2 | HEART RATE: 107 BPM | TEMPERATURE: 98.6 F

## 2022-11-29 DIAGNOSIS — S39.94XA INJURY TO SCROTUM, PENIS, OR FORESKIN, INITIAL ENCOUNTER: Primary | ICD-10-CM

## 2022-11-29 PROCEDURE — 99213 OFFICE O/P EST LOW 20 MIN: CPT | Performed by: STUDENT IN AN ORGANIZED HEALTH CARE EDUCATION/TRAINING PROGRAM

## 2022-11-29 NOTE — PROGRESS NOTES
[facility]   Internal Medicine  [address], [address]  [address]  [place], [address]  [phone]      Primary Care Visit Note    Assessment/Plan:     Tear of the foreskin  - does not appear infected and healing well  - provided wound care instructions  - topical bacitracin   - maintain control of DM to prevent infection and help w wound healing  - if any signs of infection, instructed to notify the office  - patient would like to get more information re prevention and likelihood of recurrence, instructed to discuss with his urologist.      Nathanael Pierce MD    CC:     Chief Complaint   Patient presents with    Lesion     Tear on foreskin, bleeding was noted. HPI:     Lizette Trent is a 42 y.o. male who presents for evaluation of tear of the foreskin. - tear in foreskin occurred Sunday during sexual intercourse  - initially there was bleeding, pain not as severe as expected  - pt has urologist from vasectomy  - he reports that his BG had been running high but now is better controlled. ROS:   All 10 point review of systems negative except those mentioned in the HPI. Past Medical History: Active Ambulatory Problems     Diagnosis Date Noted    Obesity, morbid ([facility] [address] 07/30/2019    Type 2 diabetes mellitus ([facility] [address]     Erectile dysfunction      Resolved Ambulatory Problems     Diagnosis Date Noted    No Resolved Ambulatory Problems     No Additional Past Medical History          Current Medications:     Current Outpatient Medications:     pantoprazole (PROTONIX) 40 mg tablet, TAKE 1 TABLET BY MOUTH EVERY DAY, Disp: 30 Tablet, Rfl: 1    metFORMIN (GLUCOPHAGE) 500 mg tablet, TAKE 2 TABLETS BY MOUTH TWICE A DAY WITH MEALS, Disp: 360 Tablet, Rfl: 1    simvastatin (ZOCOR) 40 mg tablet, Take 1 Tablet by mouth nightly., Disp: 90 Tablet, Rfl: 2    sildenafiL (REVATIO) 20 mg tablet, Take 1 Tablet by mouth daily as needed (erectile dysfunction). , Disp: 90 Tablet, Rfl: 1    sertraline (ZOLOFT) 50 mg tablet, Take 1 Tablet by mouth in the morning., Disp: 90 Tablet, Rfl: 1    ondansetron (ZOFRAN ODT) 4 mg disintegrating tablet, Take 1 Tablet by mouth every eight (8) hours as needed for Nausea or Vomiting., Disp: 20 Tablet, Rfl: 1    amoxicillin (AMOXIL) 500 mg capsule, Take 1 Capsule by mouth two (2) times a day., Disp: 20 Capsule, Rfl: 0    ciprofloxacin HCl (CILOXAN) 0.3 % ophthalmic solution, Use 1 drop e times a day on affected ear for 7 days, Disp: 5 mL, Rfl: 0    ciprofloxacin-hydrocortisone (CIPRO HC OTIC) otic suspension, Administer 3 Drops in right ear two (2) times a day., Disp: 10 mL, Rfl: 0    semaglutide (Ozempic) 0.25 mg or 0.5 mg/dose (2 mg/1.5 ml) subq pen, 0.25 mg by SubCUTAneous route every seven (7) days. , Disp: 1 Box, Rfl: 2      Past Surgical History:     Past Surgical History:   Procedure Laterality Date    HX VASECTOMY           Family History:     Family History   Problem Relation Age of Onset    Diabetes Maternal Grandmother     Diabetes Maternal Grandfather     Diabetes Paternal Grandmother     Diabetes Paternal Grandfather          Social History:     Social History     Socioeconomic History    Marital status:      Spouse name: Not on file    Number of children: Not on file    Years of education: Not on file    Highest education level: Not on file   Occupational History    Not on file   Tobacco Use    Smoking status: Never    Smokeless tobacco: Never   Vaping Use    Vaping Use: Never used   Substance and Sexual Activity    Alcohol use: Yes    Drug use: Never    Sexual activity: Yes     Partners: Female   Other Topics Concern    Not on file   Social History Narrative    Not on file     Social Determinants of Health     Financial Resource Strain: Not on file   Food Insecurity: Not on file   Transportation Needs: Not on file   Physical Activity: Not on file   Stress: Not on file   Social Connections: Not on file   Intimate Partner Violence: Not on file   Housing Stability: Not on file            Visit Vitals  /73 (BP 1 Location: Left lower arm, BP Patient Position: Sitting, BP Cuff Size: Large adult)   Pulse (!) 107   Temp 98.6 °F (37 °C) (Temporal)   Resp 16   Ht 5' 8\" (1.727 m)   Wt 302 lb 12.8 oz (137.3 kg)   SpO2 97%   BMI 46.04 kg/m²       Physical Exam:   General - Well appearing   HEENT - eomi, non-icteric sclera  Pulm - normal wob  Cardio - hemodynamically stable  Abd - benign  Extrem - no edema  : uncircumcised penis. 1 cm tear in anterior foreskin, clean margins, no erythema, no swelling, no pus. Neuro-  Alert and oriented, No focal deficits      Labs/Imaging:     Labs and imaging reviewed by me and significant for:    Lab Results   Component Value Date/Time    Hemoglobin A1c 6.5 (H) 07/25/2022 02:40 PM     Lab Results   Component Value Date/Time    Sodium 134 (L) 07/25/2022 02:40 PM    Potassium 4.2 07/25/2022 02:40 PM    Chloride 104 07/25/2022 02:40 PM    CO2 25 07/25/2022 02:40 PM    Anion gap 5 07/25/2022 02:40 PM    Glucose 101 (H) 07/25/2022 02:40 PM    BUN 16 07/25/2022 02:40 PM    Creatinine 0.91 07/25/2022 02:40 PM    BUN/Creatinine ratio 18 07/25/2022 02:40 PM    GFR est AA >60 07/25/2022 02:40 PM    GFR est non-AA >60 07/25/2022 02:40 PM    Calcium 9.5 07/25/2022 02:40 PM    Bilirubin, total 1.3 (H) 07/25/2022 02:40 PM    Alk.  phosphatase 100 07/25/2022 02:40 PM    Protein, total 8.1 07/25/2022 02:40 PM    Albumin 4.2 07/25/2022 02:40 PM    Globulin 3.9 07/25/2022 02:40 PM    A-G Ratio 1.1 07/25/2022 02:40 PM    ALT (SGPT) 153 (H) 07/25/2022 02:40 PM    AST (SGOT) 60 (H) 07/25/2022 02:40 PM     Lab Results   Component Value Date/Time    WBC 8.0 07/25/2022 02:40 PM    HGB 15.4 07/25/2022 02:40 PM    HCT 46.6 07/25/2022 02:40 PM    PLATELET 513 45/52/1793 02:40 PM    MCV 94.1 07/25/2022 02:40 PM

## 2022-11-29 NOTE — PROGRESS NOTES
Rm    Chief Complaint   Patient presents with    Lesion     Tear on foreskin, bleeding was noted. Visit Vitals  /73 (BP 1 Location: Left lower arm, BP Patient Position: Sitting, BP Cuff Size: Large adult)   Pulse (!) 107   Temp 98.6 °F (37 °C) (Temporal)   Resp 16   Ht 5' 8\" (1.727 m)   Wt 302 lb 12.8 oz (137.3 kg)   SpO2 97%   BMI 46.04 kg/m²        1. Have you been to the ER, urgent care clinic since your last visit? Hospitalized since your last visit? No    2. Have you seen or consulted any other health care providers outside of the [address] [place] [address] since your last visit? Include any pap smears or colon screening. No     Health Maintenance Due   Topic Date Due    Eye Exam Retinal or Dilated  Never done    Hepatitis B Vaccine (1 of 3 - Risk 3-dose series) Never done    COVID-19 Vaccine (4 - Booster for Moderna series) 03/14/2022    Flu Vaccine (1) 08/01/2022        3 most recent PHQ Screens 11/29/2022   Little interest or pleasure in doing things Not at all   Feeling down, depressed, irritable, or hopeless Not at all   Total Score PHQ 2 0        Fall Risk Assessment, last 12 mths 8/12/2021   Able to walk? Yes   Fall in past 12 months? 0   Do you feel unsteady?  0   Are you worried about falling 0       Learning Assessment 7/24/2020   PRIMARY LEARNER Patient   HIGHEST LEVEL OF EDUCATION - PRIMARY LEARNER  GRADUATED HIGH SCHOOL OR GED   BARRIERS PRIMARY LEARNER NONE   CO-LEARNER CAREGIVER No   PRIMARY LANGUAGE ENGLISH   LEARNER PREFERENCE PRIMARY VIDEOS   ANSWERED BY patient   RELATIONSHIP SELF

## 2022-12-04 RX ORDER — INSULIN GLARGINE 100 [IU]/ML
INJECTION, SOLUTION SUBCUTANEOUS
Qty: 4 PEN | Refills: 1 | Status: SHIPPED | OUTPATIENT
Start: 2022-12-04

## 2022-12-11 DIAGNOSIS — R11.2 NAUSEA AND VOMITING, UNSPECIFIED VOMITING TYPE: ICD-10-CM

## 2022-12-11 RX ORDER — PANTOPRAZOLE SODIUM 40 MG/1
TABLET, DELAYED RELEASE ORAL
Qty: 30 TABLET | Refills: 1 | Status: SHIPPED | OUTPATIENT
Start: 2022-12-11

## 2022-12-19 ENCOUNTER — PATIENT MESSAGE (OUTPATIENT)
Dept: INTERNAL MEDICINE CLINIC | Age: 37
End: 2022-12-19

## 2022-12-19 RX ORDER — PEN NEEDLE, DIABETIC 30 GX3/16"
NEEDLE, DISPOSABLE MISCELLANEOUS DAILY
Qty: 100 EACH | Refills: 11 | Status: SHIPPED | OUTPATIENT
Start: 2022-12-19

## 2022-12-19 NOTE — TELEPHONE ENCOUNTER
----- Message from Iza Carrasquillo sent at 12/19/2022 10:29 AM EST -----  Regarding: Terra Negrete    Can you send a prescription for the needle heads for the injectable insulin to [facility] please

## 2022-12-19 NOTE — TELEPHONE ENCOUNTER
Future Appointments:  Future Appointments   Date Time Provider Damaris Nella   12/27/2022 12:00 PM [facility] RM 2 [facility]   12/27/2022  1:00 PM [facility]   12/27/2022  2:00 PM [facility] NM RM 2 [facility]   12/27/2022  3:00 PM [facility] [place] RM 2 [facility]   12/27/2022  4:00 PM [facility] RM 2 [facility]        Last Appointment With Me:  7/25/2022     Requested Prescriptions     Pending Prescriptions Disp Refills    Insulin Needles, Disposable, 31 gauge x 5/16\" ndle 1 Each 11   ----- Message from Dwayne Henderson sent at 12/19/2022 10:29 AM EST -----  Regarding: Birtha Cea Dr Puneet Diaz    Can you send a prescription for the needle heads for the injectable insulin to [facility] please

## 2022-12-21 DIAGNOSIS — N52.9 ERECTILE DYSFUNCTION, UNSPECIFIED ERECTILE DYSFUNCTION TYPE: ICD-10-CM

## 2022-12-21 RX ORDER — SILDENAFIL CITRATE 20 MG/1
TABLET ORAL
Qty: 90 TABLET | Refills: 1 | Status: SHIPPED | OUTPATIENT
Start: 2022-12-21

## 2023-01-11 DIAGNOSIS — R11.2 NAUSEA AND VOMITING, UNSPECIFIED VOMITING TYPE: ICD-10-CM

## 2023-01-11 RX ORDER — PANTOPRAZOLE SODIUM 40 MG/1
TABLET, DELAYED RELEASE ORAL
Qty: 30 TABLET | Refills: 1 | Status: SHIPPED | OUTPATIENT
Start: 2023-01-11

## 2023-01-19 DIAGNOSIS — N52.9 ERECTILE DYSFUNCTION, UNSPECIFIED ERECTILE DYSFUNCTION TYPE: ICD-10-CM

## 2023-01-19 RX ORDER — SILDENAFIL CITRATE 20 MG/1
TABLET ORAL
Qty: 90 TABLET | Refills: 1 | Status: SHIPPED | OUTPATIENT
Start: 2023-01-19

## 2023-01-23 ENCOUNTER — TELEPHONE (OUTPATIENT)
Dept: INTERNAL MEDICINE CLINIC | Age: 38
End: 2023-01-23

## 2023-01-23 NOTE — TELEPHONE ENCOUNTER
Started PA for   Sildenafil Citrate 20MG tablets  Status: PA Request  Created: January 19th, 2023 [phone]  Sent: January 23rd, 2023

## 2023-01-24 ENCOUNTER — TELEPHONE (OUTPATIENT)
Dept: INTERNAL MEDICINE CLINIC | Age: 38
End: 2023-01-24

## 2023-01-24 NOTE — TELEPHONE ENCOUNTER
Sildenafil Citrate 20MG tablets  Status: PA Response - Denied  Created: January 19th, 2023 [phone]  Sent: January 23rd, 2023

## 2023-01-26 DIAGNOSIS — R11.2 NAUSEA AND VOMITING, UNSPECIFIED VOMITING TYPE: ICD-10-CM

## 2023-01-26 RX ORDER — PANTOPRAZOLE SODIUM 40 MG/1
40 TABLET, DELAYED RELEASE ORAL DAILY
Qty: 30 TABLET | Refills: 1 | Status: SHIPPED | OUTPATIENT
Start: 2023-01-26

## 2023-01-26 NOTE — TELEPHONE ENCOUNTER
----- Message from Araceli Womack sent at 1/26/2023 11:21 AM EST -----  Regarding: Pantroazole  Hello I was hoping to get the medicine pantorpazle transferred or a new script [facility]? Im trying to change all of my scripts to here   Also I may have spelled that wrong. Lol thank you.

## 2023-01-26 NOTE — TELEPHONE ENCOUNTER
Future Appointments:  No future appointments. Last Appointment With Me:  Visit date not found     Requested Prescriptions     Pending Prescriptions Disp Refills    pantoprazole (PROTONIX) 40 mg tablet 30 Tablet 1     Sig: Take 1 Tablet by mouth daily.

## 2023-03-30 DIAGNOSIS — R11.2 NAUSEA AND VOMITING, UNSPECIFIED VOMITING TYPE: ICD-10-CM

## 2023-03-31 RX ORDER — PANTOPRAZOLE SODIUM 40 MG/1
TABLET, DELAYED RELEASE ORAL
Qty: 30 TABLET | Refills: 1 | Status: SHIPPED | OUTPATIENT
Start: 2023-03-31

## 2023-03-31 RX ORDER — SIMVASTATIN 40 MG/1
40 TABLET, FILM COATED ORAL
Qty: 90 TABLET | Refills: 2 | Status: SHIPPED | OUTPATIENT
Start: 2023-03-31

## 2023-03-31 RX ORDER — PANTOPRAZOLE SODIUM 40 MG/1
40 TABLET, DELAYED RELEASE ORAL DAILY
Qty: 30 TABLET | Refills: 1 | Status: SHIPPED | OUTPATIENT
Start: 2023-03-31

## 2023-03-31 NOTE — TELEPHONE ENCOUNTER
Future Appointments:  Future Appointments   Date Time Provider Damaris Carmen   5/2/2023 10:00 AM Ubaldo Sanchez MD [facility] BS AM[facility]        Last Appointment With Me:  Visit date not found     Requested Prescriptions     Pending Prescriptions Disp Refills    simvastatin (ZOCOR) 40 mg tablet 90 Tablet 2     Sig: Take 1 Tablet by mouth nightly. pantoprazole (PROTONIX) 40 mg tablet 30 Tablet 1     Sig: Take 1 Tablet by mouth daily.

## 2023-04-05 RX ORDER — SILDENAFIL CITRATE 20 MG/1
TABLET ORAL
Qty: 90 TABLET | Refills: 1 | Status: SHIPPED
Start: 2023-04-05

## 2023-04-17 RX ORDER — INSULIN GLARGINE 100 [IU]/ML
INJECTION, SOLUTION SUBCUTANEOUS
Qty: 15 ML | Refills: 0 | Status: SHIPPED | OUTPATIENT
Start: 2023-04-17

## 2023-05-02 ENCOUNTER — OFFICE VISIT (OUTPATIENT)
Dept: INTERNAL MEDICINE CLINIC | Age: 38
End: 2023-05-02
Payer: COMMERCIAL

## 2023-05-02 VITALS
HEART RATE: 80 BPM | OXYGEN SATURATION: 97 % | SYSTOLIC BLOOD PRESSURE: 105 MMHG | WEIGHT: 305.2 LBS | HEIGHT: 68 IN | DIASTOLIC BLOOD PRESSURE: 68 MMHG | TEMPERATURE: 97.4 F | RESPIRATION RATE: 18 BRPM | BODY MASS INDEX: 46.26 KG/M2

## 2023-05-02 DIAGNOSIS — Z79.4 CONTROLLED TYPE 2 DIABETES MELLITUS WITHOUT COMPLICATION, WITH LONG-TERM CURRENT USE OF INSULIN (HCC): Primary | ICD-10-CM

## 2023-05-02 DIAGNOSIS — E11.9 CONTROLLED TYPE 2 DIABETES MELLITUS WITHOUT COMPLICATION, WITH LONG-TERM CURRENT USE OF INSULIN (HCC): Primary | ICD-10-CM

## 2023-05-02 DIAGNOSIS — L50.9 HIVES: ICD-10-CM

## 2023-05-02 DIAGNOSIS — F33.1 MODERATE EPISODE OF RECURRENT MAJOR DEPRESSIVE DISORDER (HCC): ICD-10-CM

## 2023-05-02 DIAGNOSIS — E66.01 CLASS 3 SEVERE OBESITY DUE TO EXCESS CALORIES WITH SERIOUS COMORBIDITY AND BODY MASS INDEX (BMI) OF 45.0 TO 49.9 IN ADULT (HCC): ICD-10-CM

## 2023-05-02 DIAGNOSIS — Z11.4 SCREENING FOR HIV (HUMAN IMMUNODEFICIENCY VIRUS): ICD-10-CM

## 2023-05-02 PROCEDURE — 99214 OFFICE O/P EST MOD 30 MIN: CPT | Performed by: INTERNAL MEDICINE

## 2023-05-03 LAB
ALBUMIN SERPL-MCNC: 4 G/DL (ref 3.5–5)
ALBUMIN/GLOB SERPL: 1.1 (ref 1.1–2.2)
ALP SERPL-CCNC: 99 U/L (ref 45–117)
ALT SERPL-CCNC: 117 U/L (ref 12–78)
ANION GAP SERPL CALC-SCNC: 4 MMOL/L (ref 5–15)
APPEARANCE UR: CLEAR
AST SERPL-CCNC: 48 U/L (ref 15–37)
BASOPHILS # BLD: 0 K/UL (ref 0–0.1)
BASOPHILS NFR BLD: 0 % (ref 0–1)
BILIRUB SERPL-MCNC: 0.7 MG/DL (ref 0.2–1)
BILIRUB UR QL: NEGATIVE
BUN SERPL-MCNC: 18 MG/DL (ref 6–20)
BUN/CREAT SERPL: 23 (ref 12–20)
CALCIUM SERPL-MCNC: 9.8 MG/DL (ref 8.5–10.1)
CHLORIDE SERPL-SCNC: 106 MMOL/L (ref 97–108)
CHOLEST SERPL-MCNC: 143 MG/DL
CO2 SERPL-SCNC: 26 MMOL/L (ref 21–32)
COLOR UR: NORMAL
CREAT SERPL-MCNC: 0.8 MG/DL (ref 0.7–1.3)
CREAT UR-MCNC: 62.1 MG/DL
DIFFERENTIAL METHOD BLD: NORMAL
EOSINOPHIL # BLD: 0.1 K/UL (ref 0–0.4)
EOSINOPHIL NFR BLD: 2 % (ref 0–7)
ERYTHROCYTE [DISTWIDTH] IN BLOOD BY AUTOMATED COUNT: 12.2 % (ref 11.5–14.5)
EST. AVERAGE GLUCOSE BLD GHB EST-MCNC: 160 MG/DL
GLOBULIN SER CALC-MCNC: 3.5 G/DL (ref 2–4)
GLUCOSE SERPL-MCNC: 111 MG/DL (ref 65–100)
GLUCOSE UR STRIP.AUTO-MCNC: NEGATIVE MG/DL
HBA1C MFR BLD: 7.2 % (ref 4–5.6)
HCT VFR BLD AUTO: 43.8 % (ref 36.6–50.3)
HDLC SERPL-MCNC: 38 MG/DL
HDLC SERPL: 3.8 (ref 0–5)
HGB BLD-MCNC: 14.1 G/DL (ref 12.1–17)
HGB UR QL STRIP: NEGATIVE
HIV 1+2 AB+HIV1 P24 AG SERPL QL IA: NONREACTIVE
HIV12 RESULT COMMENT, HHIVC: NORMAL
IMM GRANULOCYTES # BLD AUTO: 0 K/UL (ref 0–0.04)
IMM GRANULOCYTES NFR BLD AUTO: 0 % (ref 0–0.5)
KETONES UR QL STRIP.AUTO: NEGATIVE MG/DL
LDLC SERPL CALC-MCNC: 85.8 MG/DL (ref 0–100)
LEUKOCYTE ESTERASE UR QL STRIP.AUTO: NEGATIVE
LYMPHOCYTES # BLD: 1.8 K/UL (ref 0.8–3.5)
LYMPHOCYTES NFR BLD: 25 % (ref 12–49)
MCH RBC QN AUTO: 30.8 PG (ref 26–34)
MCHC RBC AUTO-ENTMCNC: 32.2 G/DL (ref 30–36.5)
MCV RBC AUTO: 95.6 FL (ref 80–99)
MICROALBUMIN UR-MCNC: <0.5 MG/DL
MICROALBUMIN/CREAT UR-RTO: <8 MG/G (ref 0–30)
MONOCYTES # BLD: 0.5 K/UL (ref 0–1)
MONOCYTES NFR BLD: 7 % (ref 5–13)
NEUTS SEG # BLD: 4.6 K/UL (ref 1.8–8)
NEUTS SEG NFR BLD: 66 % (ref 32–75)
NITRITE UR QL STRIP.AUTO: NEGATIVE
NRBC # BLD: 0 K/UL (ref 0–0.01)
NRBC BLD-RTO: 0 PER 100 WBC
PH UR STRIP: 6 (ref 5–8)
PLATELET # BLD AUTO: 282 K/UL (ref 150–400)
PMV BLD AUTO: 10.4 FL (ref 8.9–12.9)
POTASSIUM SERPL-SCNC: 4.5 MMOL/L (ref 3.5–5.1)
PROT SERPL-MCNC: 7.5 G/DL (ref 6.4–8.2)
PROT UR STRIP-MCNC: NEGATIVE MG/DL
RBC # BLD AUTO: 4.58 M/UL (ref 4.1–5.7)
SODIUM SERPL-SCNC: 136 MMOL/L (ref 136–145)
SP GR UR REFRACTOMETRY: 1.01 (ref 1–1.03)
TRIGL SERPL-MCNC: 96 MG/DL (ref ?–150)
TSH SERPL DL<=0.05 MIU/L-ACNC: 2.01 UIU/ML (ref 0.36–3.74)
UROBILINOGEN UR QL STRIP.AUTO: 0.2 EU/DL (ref 0.2–1)
VLDLC SERPL CALC-MCNC: 19.2 MG/DL
WBC # BLD AUTO: 7.1 K/UL (ref 4.1–11.1)

## 2023-05-15 RX ORDER — INSULIN GLARGINE 100 [IU]/ML
INJECTION, SOLUTION SUBCUTANEOUS
Qty: 15 ML | Refills: 0 | Status: SHIPPED | OUTPATIENT
Start: 2023-05-15 | End: 2023-05-18

## 2023-05-15 RX ORDER — SILDENAFIL CITRATE 20 MG/1
TABLET ORAL
Qty: 90 TABLET | Refills: 1 | Status: SHIPPED | OUTPATIENT
Start: 2023-05-15

## 2023-05-15 NOTE — TELEPHONE ENCOUNTER
PCP: Isauro Heller MD    Last appt:   5/2/2023    Future Appointments   Date Time Provider Litzy Pahceco   8/29/2023 10:45 AM Isauro Heller MD [facility] BS AMB       Requested Prescriptions     Pending Prescriptions Disp Refills    sildenafil (REVATIO) 20 MG tablet [Pharmacy Med Name: SILDENAFIL 20 MG TAB[*]] 90 tablet 1     Sig: TAKE ONE TABLET BY MOUTH ONE TIME DAILY AS NEEDED    RACQUELAR KWIKPEN 100 UNIT/ML injection pen [Pharmacy Med Name: Mily Tapia 100 UNIT/ML KWIKPEN[*$R]] 15 mL 0     Sig: INJECT 46 UNITS UNDER THE SKIN ONE TIME DAILY

## 2023-05-18 RX ORDER — INSULIN GLARGINE 100 [IU]/ML
INJECTION, SOLUTION SUBCUTANEOUS
Qty: 15 ML | Refills: 0 | Status: SHIPPED | OUTPATIENT
Start: 2023-05-18

## 2023-05-18 NOTE — TELEPHONE ENCOUNTER
----- Message from Tammy Hernández sent at 5/18/2023 10:28 AM EDT -----  Regarding: Metformin Refill   Contact: [phone]  Hello,     I just need metformin as I am out sent to [facility] thank you.

## 2023-05-18 NOTE — TELEPHONE ENCOUNTER
PCP: Sam Aguero MD    Last appt:   5/2/2023    Future Appointments   Date Time Provider Litzy Pacheco   8/29/2023 10:45 AM Sam Aguero MD [facility] BS AMB       Requested Prescriptions     Pending Prescriptions Disp Refills    BASAGLAR KWIKPEN 100 UNIT/ML injection pen [Pharmacy Med Name: Briana Lopez 100 UNIT/ML KWIKPEN[*$R]] 15 mL 0     Sig: INJECT 46 UNITS UNDER THE SKIN ONE TIME DAILY    metFORMIN (GLUCOPHAGE) 500 MG tablet 180 tablet 2     Sig: TAKE 2 TABLETS BY MOUTH TWICE A DAY WITH MEALS

## 2023-05-18 NOTE — TELEPHONE ENCOUNTER
PCP: Da Restrepo MD    Last appt:   5/2/2023    Future Appointments   Date Time Provider Litzy Pacheco   8/29/2023 10:45 AM Da Restrepo MD [facility] BS AMB       Requested Prescriptions     Pending Prescriptions Disp Refills    BASAGLAR KWIKPEN 100 UNIT/ML injection pen [Pharmacy Med Name: BASAGLAR 100 UNIT/ML KWIKPEN[*$R]] 15 mL 0     Sig: INJECT 46 UNITS UNDER THE SKIN ONE TIME DAILY

## 2023-05-23 RX ORDER — INSULIN GLARGINE 100 [IU]/ML
INJECTION, SOLUTION SUBCUTANEOUS
Qty: 15 ML | Refills: 0 | OUTPATIENT
Start: 2023-05-23

## 2023-05-23 RX ORDER — SILDENAFIL CITRATE 20 MG/1
20 TABLET ORAL DAILY
Qty: 30 TABLET | Refills: 0 | Status: SHIPPED | OUTPATIENT
Start: 2023-05-23 | End: 2023-05-24 | Stop reason: SDUPTHER

## 2023-05-24 RX ORDER — SIMVASTATIN 40 MG
1 TABLET ORAL NIGHTLY
COMMUNITY
Start: 2023-03-31

## 2023-05-24 RX ORDER — INSULIN GLARGINE 100 [IU]/ML
INJECTION, SOLUTION SUBCUTANEOUS
Qty: 15 ML | Refills: 0 | Status: SHIPPED | OUTPATIENT
Start: 2023-05-24

## 2023-05-24 RX ORDER — SILDENAFIL CITRATE 20 MG/1
20 TABLET ORAL DAILY
Qty: 30 TABLET | Refills: 0 | Status: SHIPPED | OUTPATIENT
Start: 2023-05-24

## 2023-05-24 RX ORDER — PANTOPRAZOLE SODIUM 40 MG/1
1 TABLET, DELAYED RELEASE ORAL DAILY
COMMUNITY
Start: 2023-03-31

## 2023-05-24 RX ORDER — SILDENAFIL CITRATE 20 MG/1
TABLET ORAL
COMMUNITY
Start: 2023-04-05 | End: 2023-05-24 | Stop reason: SDUPTHER

## 2023-05-24 RX ORDER — INSULIN GLARGINE 100 [IU]/ML
INJECTION, SOLUTION SUBCUTANEOUS
COMMUNITY
Start: 2023-04-17

## 2023-05-24 NOTE — TELEPHONE ENCOUNTER
PCP: Nirmala Rizzo MD    Last appt:   5/2/2023    Future Appointments   Date Time Provider Litzy Pacheco   8/29/2023 10:45 AM Nirmala Rizzo MD [facility] BS AMB       Requested Prescriptions     Pending Prescriptions Disp Refills    insulin glargine (BASAGLAR KWIKPEN) 100 UNIT/ML injection pen 15 mL 0     Sig: INJECT 46 UNITS UNDER THE SKIN ONE TIME DAILY

## 2023-05-24 NOTE — TELEPHONE ENCOUNTER
PCP: Gracie Coley MD    Last appt:   5/2/2023    Future Appointments   Date Time Provider Litzy Pacheco   8/29/2023 10:45 AM Gracie Coley MD [facility] BS AMB       Requested Prescriptions     Pending Prescriptions Disp Refills    sildenafil (REVATIO) 20 MG tablet 30 tablet 0     Sig: Take 1 tablet by mouth daily

## 2023-05-25 ENCOUNTER — TELEPHONE (OUTPATIENT)
Age: 38
End: 2023-05-25

## 2023-07-03 RX ORDER — INSULIN GLARGINE 100 [IU]/ML
INJECTION, SOLUTION SUBCUTANEOUS
Qty: 15 ML | Refills: 0 | Status: SHIPPED | OUTPATIENT
Start: 2023-07-03

## 2023-07-21 NOTE — TELEPHONE ENCOUNTER
PCP: Joy Hudson MD    Last appt:   5/2/2023    Future Appointments   Date Time Provider [address]   8/29/2023 10:45 AM Joy Hudson MD [facility] BS AMB       Requested Prescriptions     Pending Prescriptions Disp Refills    sertraline (ZOLOFT) 50 MG tablet 90 tablet 1     Sig: Take 1 tablet by mouth daily

## 2023-09-18 RX ORDER — SILDENAFIL CITRATE 20 MG/1
20 TABLET ORAL DAILY
Qty: 30 TABLET | Refills: 0 | Status: SHIPPED | OUTPATIENT
Start: 2023-09-18

## 2023-09-18 RX ORDER — INSULIN GLARGINE 100 [IU]/ML
INJECTION, SOLUTION SUBCUTANEOUS
Qty: 15 ML | Refills: 0 | Status: SHIPPED | OUTPATIENT
Start: 2023-09-18

## 2023-10-26 RX ORDER — SILDENAFIL CITRATE 20 MG/1
20 TABLET ORAL DAILY
Qty: 30 TABLET | Refills: 0 | Status: SHIPPED | OUTPATIENT
Start: 2023-10-26

## 2023-10-26 RX ORDER — INSULIN GLARGINE 100 [IU]/ML
INJECTION, SOLUTION SUBCUTANEOUS
Qty: 15 ML | Refills: 0 | Status: SHIPPED | OUTPATIENT
Start: 2023-10-26

## 2023-12-19 NOTE — PROGRESS NOTES
December 21, 2023       Joanne Madison  [address]  [facility] [address]        Dear Ms. Madison,    Please review the enclosed prep instructions for your procedure with Gayle Wallace MD.    Date of Procedure: January 10, 2024  Time of Procedure: Someone from the facility will call you 2-3 days prior with your procedure and arrival times.    IMPORTANT REQUIREMENTS  If you are scheduled with Monitored Anesthesia Care (MAC) sedation and will need to have a legal adult (18 years or older) stay with you overnight the day of your procedure or your procedure will be canceled.     Location:  [facility]   [address] [place] [id number] [phone]    Please do not take Protonix 5 days prior to your procedure    If you have any questions regarding these instructions, please call Real [phone].     Thank you,    Real [phone]  Surgery Scheduler for Gayle Wallace MD                              ESOPHAGOGASTRODUODENOSCOPY (EGD)  PRE-PROCEDURE INSTRUCTIONS  Gayle Wallace MD    Please read these instructions thoroughly. If you have any questions, please call the physician's office at   [phone]      TRANSPORTATION:  A responsible adult must drive you home after the procedure.  The medication given during the procedure may cause drowsiness, making it unsafe for you to drive or operate machinery.  A taxi is not acceptable transportation. Please make arrangements in advance or we will not be able to do your procedure.  Please make arrangements for someone to stay with you or check in on you frequently the rest of the day/evening until the drowsiness has completely worn off.    CANCELLATION AND RESCHEDULE POLICY:  In the event that you need to cancel or reschedule your procedure, please call:  Real [phone]    SPECIAL INSTRUCTIONS    SPECIAL CONDITIONS:  Contact your primary care physician if you have diabetes and take  CC: Diabetes, Medication Evaluation, and Dizziness      HPI:    He is a 43 y.o. male who presents for evaluation of diabetes         Type 2 diabetes: Currently on 46 units of Tresiba and 2000 mg of Metformin daily. He reports following a low sugar diet. Reports his blood sugar is in the 100 range fasting and 170 after eating. ozempic not affordable    Eating low carb and low sugar diet  Denies vision changes and neuropathy  - prescribed bydureon in the past and unable to obtain due to cost   - on lipitor since Dec 2019     High cholesterol: on lipitor   Tolerating it well  Denies muscle aches  Anxiety previously was started on Zoloft 50 mg he noted an improvement in depression. He changed jobs which has helped his depression tremendously. He then stopped Zoloft and he feels okay. He is able to manage to the anxiety. He reports 1 week of feeling slightly dizzy and unsteady. He recently traveled to [place] He denies any low blood sugars. He denies focal weakness or vision changes. This is an established visit conducted via telemedicine with video. The patient has been instructed that this meets HIPAA criteria and acknowledges and agrees to this method of visitation. Pursuant to the emergency declaration under the [facility], [facility] waiver authority and the [employer] and [employer] Act, this Virtual Visit was conducted, with patient's consent, to reduce the patient's risk of exposure to COVID-19 and provide continuity of care for an established patient. Services were provided through a video synchronous discussion virtually to substitute for in-person clinic visit.        ROS:  Constitutional: negative for fevers, chills, anorexia and weight loss  10 systems reviewed and negative other than HPI     Past Medical History:   Diagnosis Date    Erectile dysfunction     Type 2 diabetes mellitus ([facility] [address]        Current Outpatient Medications on File Prior to Visit   Medication Sig Dispense Refill    sildenafiL, pulmonary hypertension, (REVATIO) 20 mg tablet TAKE 1 TABLET BY MOUTH EVERY DAY AS NEEDED FOR ERECTILE DYSFUNCTION 90 Tablet 1    metFORMIN (GLUCOPHAGE) 500 mg tablet TAKE 2 TABLETS BY MOUTH TWICE A DAY WITH MEALS 360 Tablet 0    insulin glargine (Basaglar KwikPen U-100 Insulin) 100 unit/mL (3 mL) inpn INJECT 46 UNITS UNDER THE SKIN DAILY INDICATIONS: TYPE 2 DIABETES MELLITUS 6 Pen 1    simvastatin (ZOCOR) 40 mg tablet TAKE 1 TABLET BY MOUTH EVERY DAY AT NIGHT 90 Tab 2    [DISCONTINUED] sildenafiL, pulmonary hypertension, (REVATIO) 20 mg tablet TAKE 1 TAB BY MOUTH DAILY AS NEEDED (ERECTILE DYSFUNCTION). 90 Tab 1     No current facility-administered medications on file prior to visit. Past Surgical History:   Procedure Laterality Date    HX VASECTOMY         Family History   Problem Relation Age of Onset    Diabetes Maternal Grandmother     Diabetes Maternal Grandfather     Diabetes Paternal Grandmother     Diabetes Paternal Grandfather      Reviewed and no changes     Social History     Socioeconomic History    Marital status:      Spouse name: Not on file    Number of children: Not on file    Years of education: Not on file    Highest education level: Not on file   Occupational History    Not on file   Tobacco Use    Smoking status: Never Smoker    Smokeless tobacco: Never Used   Vaping Use    Vaping Use: Never used   Substance and Sexual Activity    Alcohol use:  Yes    Drug use: Never    Sexual activity: Yes     Partners: Female   Other Topics Concern    Not on file   Social History Narrative    Not on file     Social Determinants of Health     Financial Resource Strain:     Difficulty of Paying Living Expenses:    Food Insecurity:     Worried About Running Out of Food in the Last Year:     [address] in the Last Year:    Transportation Needs:     Lack of Transportation insulin. You may need to have your insulin dose adjusted the day before and the day of the procedure.  Do not drink any alcoholic beverages for at least 24 hours before the procedure.    FIVE (5) DAYS BEFORE THE PROCEDURE:  Do not take iron supplements or Pepto-Bismol. These products may make it more difficult for the physician to see the inside of the stomach.  Please do not take Protonix 5 days prior to your procedure  Do not eat products with [place] (a fat substitute found in [employer] and [place] Fat-Free chips)  It is important to continue to take all other prescribed medications. On the day of the procedure, please take your blood pressure medications with a sip of water.  Do not take any blood thinning or platelet-modifying medications (such as Coumadin (warfarin), Plavix (clopidogrel), Aggrenox, Ticlid, etc.) unless otherwise directed.    DIET:  You may follow a normal diet up until midnight prior to your procedure.  Sips of water after midnight unless directed not to.    DAY OF THE PROCEDURE:  You may take your medications the morning of your procedure with sips of water.  Ferrous Sulfate  -Hold day of procedure   You will receive sedation during the procedure: please make sure you have someone to drive you that day.  Do not plan on going to work or doing anything after the procedure, as you will be drowsy most of the afternoon.      AFTER THE PROCEDURE:  You may resume your regular diet. Start slow with small amounts and increase as you see how well you do.  Please call the physician's office that did the procedure if you have further questions or if you need additional care.    Check with the doctor as to when you can resume your medications after the procedure.    RESULTS:  If tissue samples were take during your procedure, your results will take approximately 10 working days to return.   If you have not heard from the doctor's office please call [phone]    Thank you for entrusting  (Medical):  Lack of Transportation (Non-Medical):    Physical Activity:     Days of Exercise per Week:     Minutes of Exercise per Session:    Stress:     Feeling of Stress :    Social Connections:     Frequency of Communication with Friends and Family:     Frequency of Social Gatherings with Friends and Family:     Attends Yarsani Services:     Active Member of Clubs or Organizations:     Attends Club or Organization Meetings:     Marital Status:    Intimate Partner Violence:     Fear of Current or Ex-Partner:     Emotionally Abused:     Physically Abused:     Sexually Abused: There were no vitals taken for this visit. Physical Examination:   Gen: well appearing male  HEENT: normal conjunctiva, no audible congestion, patient does not see oral erythema, has MMM  Neck: patient does not feel enlarged or tender LAD or masses  Resp: normal respiratory effort, no audible wheezing. CV: patient does not feel palpitations or heart irregularity  Abd: patient does not feel abdominal tenderness or mass, patient does not notice distension  Extrem: patient does not see swelling in ankles or joints.    Neuro: Alert and oriented, able to answer questions without difficulty, able to move all extremities and walk normally          Lab Results   Component Value Date/Time    WBC 7.6 02/23/2021 12:37 PM    HGB 14.7 02/23/2021 12:37 PM    HCT 44.3 02/23/2021 12:37 PM    PLATELET 415 86/78/6385 12:37 PM    MCV 93 02/23/2021 12:37 PM     Lab Results   Component Value Date/Time    Sodium 141 02/23/2021 12:37 PM    Potassium 4.5 02/23/2021 12:37 PM    Chloride 102 02/23/2021 12:37 PM    CO2 26 02/23/2021 12:37 PM    Anion gap 6 07/17/2019 11:42 AM    Glucose 60 (L) 02/23/2021 12:37 PM    BUN 13 02/23/2021 12:37 PM    Creatinine 0.93 02/23/2021 12:37 PM    BUN/Creatinine ratio 14 02/23/2021 12:37 PM    GFR est  02/23/2021 12:37 PM    GFR est non- 02/23/2021 12:37 PM    Calcium 9.7 02/23/2021 12:37 your care to [facility].     PM     Lab Results   Component Value Date/Time    Cholesterol, total 121 08/21/2020 11:40 AM    HDL Cholesterol 34 (L) 08/21/2020 11:40 AM    LDL, calculated 69 08/21/2020 11:40 AM    VLDL, calculated 18 08/21/2020 11:40 AM    Triglyceride 91 08/21/2020 11:40 AM     Lab Results   Component Value Date/Time    TSH 1.980 12/05/2019 09:56 AM     No results found for: PSA, [place] Slot, [id number], [id number]  Lab Results   Component Value Date/Time    Hemoglobin A1c 5.5 02/23/2021 12:37 PM     No results found for: VITD3, XQVID2, XQVID3, Jannice Rattler, VD3RIA    Lab Results   Component Value Date/Time    ALT (SGPT) 69 (H) 02/23/2021 12:37 PM    Alk. phosphatase 93 02/23/2021 12:37 PM    Bilirubin, total 0.7 02/23/2021 12:37 PM           Assessment/Plan:    1. Type 2 diabetes insulin-dependent. Now controlled  -continue metformin  continue   46 units, discussed that if remains above 200 fasting in AM to increase by 2 units every 3 days   Continue metformin   Fitzgerald has been an issue with insulin and GLP drugs  Added glipizide 5mg with two largest meals today      2. High cholesterol: on lipitor    3. Obesity: making progress with weight loss  Follow-up and Dispositions    · Return in about 3 months (around 11/12/2021).         Orders Placed This Encounter    METABOLIC PANEL, COMPREHENSIVE     Standing Status:   Future     Standing Expiration Date:   8/12/2022    HEMOGLOBIN A1C WITH EAG     Standing Status:   Future     Standing Expiration Date:   8/12/2022    HEPATITIS C AB     Standing Status:   Future     Standing Expiration Date:   8/12/2022    TSH 3RD GENERATION     Standing Status:   Future     Standing Expiration Date:   8/12/2022    CBC WITH AUTOMATED DIFF     Standing Status:   Future     Standing Expiration Date:   8/12/2022    LIPID PANEL     Standing Status:   Future     Standing Expiration Date:   8/12/2022    MICROALBUMIN, UR, RAND W/ MICROALB/CREAT RATIO     Standing Status:   Future Standing Expiration Date:   8/12/2022    semaglutide (Ozempic) 0.25 mg or 0.5 mg/dose (2 mg/1.5 ml) subq pen     Sig: INJECT 0.25 MG BY SUBCUTANEOUS ROUTE EVERY SEVEN (7) DAYS. Dispense:  1 Box     Refill:  1         Bossman Washington MD    This is an established visit conducted via real time video and audio telemedicine. The patient has been instructed that this meets HIPAA criteria and acknowledges and agrees to this method of visitation.

## 2023-12-26 RX ORDER — INSULIN GLARGINE 100 [IU]/ML
INJECTION, SOLUTION SUBCUTANEOUS
Qty: 15 ML | Refills: 0 | Status: SHIPPED | OUTPATIENT
Start: 2023-12-26

## 2023-12-26 RX ORDER — SILDENAFIL CITRATE 20 MG/1
20 TABLET ORAL DAILY
Qty: 30 TABLET | Refills: 0 | Status: SHIPPED | OUTPATIENT
Start: 2023-12-26

## 2024-02-03 SDOH — ECONOMIC STABILITY: HOUSING INSECURITY
IN THE LAST 12 MONTHS, WAS THERE A TIME WHEN YOU DID NOT HAVE A STEADY PLACE TO SLEEP OR SLEPT IN A SHELTER (INCLUDING NOW)?: NO

## 2024-02-03 SDOH — ECONOMIC STABILITY: TRANSPORTATION INSECURITY
IN THE PAST 12 MONTHS, HAS LACK OF TRANSPORTATION KEPT YOU FROM MEETINGS, WORK, OR FROM GETTING THINGS NEEDED FOR DAILY LIVING?: NO

## 2024-02-03 SDOH — ECONOMIC STABILITY: FOOD INSECURITY: WITHIN THE PAST 12 MONTHS, YOU WORRIED THAT YOUR FOOD WOULD RUN OUT BEFORE YOU GOT MONEY TO BUY MORE.: SOMETIMES TRUE

## 2024-02-03 SDOH — ECONOMIC STABILITY: FOOD INSECURITY: WITHIN THE PAST 12 MONTHS, THE FOOD YOU BOUGHT JUST DIDN'T LAST AND YOU DIDN'T HAVE MONEY TO GET MORE.: SOMETIMES TRUE

## 2024-02-03 SDOH — ECONOMIC STABILITY: INCOME INSECURITY: HOW HARD IS IT FOR YOU TO PAY FOR THE VERY BASICS LIKE FOOD, HOUSING, MEDICAL CARE, AND HEATING?: SOMEWHAT HARD

## 2024-02-06 ENCOUNTER — TELEPHONE (OUTPATIENT)
Age: 39
End: 2024-02-06

## 2024-02-06 ENCOUNTER — OFFICE VISIT (OUTPATIENT)
Age: 39
End: 2024-02-06
Payer: COMMERCIAL

## 2024-02-06 VITALS
RESPIRATION RATE: 16 BRPM | HEART RATE: 90 BPM | SYSTOLIC BLOOD PRESSURE: 106 MMHG | OXYGEN SATURATION: 97 % | DIASTOLIC BLOOD PRESSURE: 74 MMHG | HEIGHT: 68 IN | TEMPERATURE: 97 F | WEIGHT: 281.4 LBS | BODY MASS INDEX: 42.65 KG/M2

## 2024-02-06 DIAGNOSIS — M72.2 PLANTAR FASCIITIS, BILATERAL: ICD-10-CM

## 2024-02-06 DIAGNOSIS — E11.9 TYPE 2 DIABETES MELLITUS WITHOUT COMPLICATION, WITH LONG-TERM CURRENT USE OF INSULIN (HCC): Primary | ICD-10-CM

## 2024-02-06 DIAGNOSIS — Z79.4 TYPE 2 DIABETES MELLITUS WITHOUT COMPLICATION, WITH LONG-TERM CURRENT USE OF INSULIN (HCC): Primary | ICD-10-CM

## 2024-02-06 DIAGNOSIS — E66.01 MORBID (SEVERE) OBESITY DUE TO EXCESS CALORIES (HCC): ICD-10-CM

## 2024-02-06 DIAGNOSIS — Z79.4 LONG TERM (CURRENT) USE OF INSULIN (HCC): ICD-10-CM

## 2024-02-06 DIAGNOSIS — E78.00 HIGH CHOLESTEROL: ICD-10-CM

## 2024-02-06 LAB
ALBUMIN SERPL-MCNC: 4 G/DL (ref 3.5–5)
ALBUMIN/GLOB SERPL: 1.1 (ref 1.1–2.2)
ALP SERPL-CCNC: 133 U/L (ref 45–117)
ALT SERPL-CCNC: 58 U/L (ref 12–78)
ANION GAP SERPL CALC-SCNC: 7 MMOL/L (ref 5–15)
AST SERPL-CCNC: 23 U/L (ref 15–37)
BASOPHILS # BLD: 0.1 K/UL (ref 0–0.1)
BASOPHILS NFR BLD: 1 % (ref 0–1)
BILIRUB SERPL-MCNC: 0.7 MG/DL (ref 0.2–1)
BUN SERPL-MCNC: 19 MG/DL (ref 6–20)
BUN/CREAT SERPL: 18 (ref 12–20)
CALCIUM SERPL-MCNC: 9.6 MG/DL (ref 8.5–10.1)
CHLORIDE SERPL-SCNC: 101 MMOL/L (ref 97–108)
CHOLEST SERPL-MCNC: 214 MG/DL
CO2 SERPL-SCNC: 25 MMOL/L (ref 21–32)
CREAT SERPL-MCNC: 1.06 MG/DL (ref 0.7–1.3)
CREAT UR-MCNC: 38.5 MG/DL
DIFFERENTIAL METHOD BLD: ABNORMAL
EOSINOPHIL # BLD: 0.1 K/UL (ref 0–0.4)
EOSINOPHIL NFR BLD: 1 % (ref 0–7)
ERYTHROCYTE [DISTWIDTH] IN BLOOD BY AUTOMATED COUNT: 11.8 % (ref 11.5–14.5)
EST. AVERAGE GLUCOSE BLD GHB EST-MCNC: 272 MG/DL
GLOBULIN SER CALC-MCNC: 3.8 G/DL (ref 2–4)
GLUCOSE SERPL-MCNC: 474 MG/DL (ref 65–100)
HBA1C MFR BLD: 11.1 % (ref 4–5.6)
HCT VFR BLD AUTO: 45.9 % (ref 36.6–50.3)
HDLC SERPL-MCNC: 33 MG/DL
HDLC SERPL: 6.5 (ref 0–5)
HGB BLD-MCNC: 15.6 G/DL (ref 12.1–17)
IMM GRANULOCYTES # BLD AUTO: 0 K/UL (ref 0–0.04)
IMM GRANULOCYTES NFR BLD AUTO: 1 % (ref 0–0.5)
LDLC SERPL CALC-MCNC: 108.4 MG/DL (ref 0–100)
LYMPHOCYTES # BLD: 2.1 K/UL (ref 0.8–3.5)
LYMPHOCYTES NFR BLD: 25 % (ref 12–49)
MCH RBC QN AUTO: 31.1 PG (ref 26–34)
MCHC RBC AUTO-ENTMCNC: 34 G/DL (ref 30–36.5)
MCV RBC AUTO: 91.4 FL (ref 80–99)
MICROALBUMIN UR-MCNC: <0.5 MG/DL
MICROALBUMIN/CREAT UR-RTO: <13 MG/G (ref 0–30)
MONOCYTES # BLD: 0.6 K/UL (ref 0–1)
MONOCYTES NFR BLD: 7 % (ref 5–13)
NEUTS SEG # BLD: 5.6 K/UL (ref 1.8–8)
NEUTS SEG NFR BLD: 65 % (ref 32–75)
NRBC # BLD: 0 K/UL (ref 0–0.01)
NRBC BLD-RTO: 0 PER 100 WBC
PLATELET # BLD AUTO: 306 K/UL (ref 150–400)
PMV BLD AUTO: 10.7 FL (ref 8.9–12.9)
POTASSIUM SERPL-SCNC: 4.5 MMOL/L (ref 3.5–5.1)
PROT SERPL-MCNC: 7.8 G/DL (ref 6.4–8.2)
RBC # BLD AUTO: 5.02 M/UL (ref 4.1–5.7)
SODIUM SERPL-SCNC: 133 MMOL/L (ref 136–145)
TRIGL SERPL-MCNC: 363 MG/DL
VLDLC SERPL CALC-MCNC: 72.6 MG/DL
WBC # BLD AUTO: 8.6 K/UL (ref 4.1–11.1)

## 2024-02-06 PROCEDURE — 99214 OFFICE O/P EST MOD 30 MIN: CPT | Performed by: INTERNAL MEDICINE

## 2024-02-06 RX ORDER — INSULIN DEGLUDEC 200 U/ML
56 INJECTION, SOLUTION SUBCUTANEOUS DAILY
Qty: 8 ML | Refills: 2 | Status: SHIPPED | OUTPATIENT
Start: 2024-02-06 | End: 2024-02-07

## 2024-02-06 RX ORDER — CELECOXIB 200 MG/1
200 CAPSULE ORAL DAILY
Qty: 30 CAPSULE | Refills: 0 | Status: SHIPPED | OUTPATIENT
Start: 2024-02-06

## 2024-02-06 SDOH — ECONOMIC STABILITY: INCOME INSECURITY: HOW HARD IS IT FOR YOU TO PAY FOR THE VERY BASICS LIKE FOOD, HOUSING, MEDICAL CARE, AND HEATING?: NOT HARD AT ALL

## 2024-02-06 SDOH — ECONOMIC STABILITY: FOOD INSECURITY: WITHIN THE PAST 12 MONTHS, THE FOOD YOU BOUGHT JUST DIDN'T LAST AND YOU DIDN'T HAVE MONEY TO GET MORE.: NEVER TRUE

## 2024-02-06 SDOH — ECONOMIC STABILITY: FOOD INSECURITY: WITHIN THE PAST 12 MONTHS, YOU WORRIED THAT YOUR FOOD WOULD RUN OUT BEFORE YOU GOT MONEY TO BUY MORE.: NEVER TRUE

## 2024-02-06 ASSESSMENT — PATIENT HEALTH QUESTIONNAIRE - PHQ9
8. MOVING OR SPEAKING SO SLOWLY THAT OTHER PEOPLE COULD HAVE NOTICED. OR THE OPPOSITE, BEING SO FIGETY OR RESTLESS THAT YOU HAVE BEEN MOVING AROUND A LOT MORE THAN USUAL: 0
5. POOR APPETITE OR OVEREATING: 1
9. THOUGHTS THAT YOU WOULD BE BETTER OFF DEAD, OR OF HURTING YOURSELF: 0
SUM OF ALL RESPONSES TO PHQ QUESTIONS 1-9: 5
7. TROUBLE CONCENTRATING ON THINGS, SUCH AS READING THE NEWSPAPER OR WATCHING TELEVISION: 0
2. FEELING DOWN, DEPRESSED OR HOPELESS: 1
SUM OF ALL RESPONSES TO PHQ QUESTIONS 1-9: 5
6. FEELING BAD ABOUT YOURSELF - OR THAT YOU ARE A FAILURE OR HAVE LET YOURSELF OR YOUR FAMILY DOWN: 0
SUM OF ALL RESPONSES TO PHQ QUESTIONS 1-9: 5
4. FEELING TIRED OR HAVING LITTLE ENERGY: 1
SUM OF ALL RESPONSES TO PHQ QUESTIONS 1-9: 5
SUM OF ALL RESPONSES TO PHQ9 QUESTIONS 1 & 2: 2
3. TROUBLE FALLING OR STAYING ASLEEP: 1
1. LITTLE INTEREST OR PLEASURE IN DOING THINGS: 1
10. IF YOU CHECKED OFF ANY PROBLEMS, HOW DIFFICULT HAVE THESE PROBLEMS MADE IT FOR YOU TO DO YOUR WORK, TAKE CARE OF THINGS AT HOME, OR GET ALONG WITH OTHER PEOPLE: 1

## 2024-02-06 NOTE — PROGRESS NOTES
\"Have you been to the ER, urgent care clinic since your last visit?  Hospitalized since your last visit?\"    Yes [facility] minute clinic pt stated he lost his voice    “Have you seen or consulted any other health care providers outside of [facility] since your last visit?”    NO

## 2024-02-06 NOTE — TELEPHONE ENCOUNTER
[employer] called in stating insulin Tresiba is not covered by patients insurance, insurance will cover Lantis. Please change order

## 2024-02-06 NOTE — PROGRESS NOTES
Mr. Chano Lacy is presenting to follow up     CC:  Follow-up and Diabetes         He is a 38 y.o. male who presents for evaluation of diabetes high cholesterol and depression       Recall he had increased abdominal pain and work up negative     Then patient had US of gallbladder and HYDA scan    Attributed to  ozempic and stopped since stopping ozempic symptoms are significantly better       Type 2 diabetes: on  2000 mg of Metformin daily.  He reports following a low sugar diet.   He stopped ozempic due to above   on insulin 54 units basaglar - received letter will no longer be covered     checking sugars once a week and ranging in the 200      Intentional weight loss 360 currently at 280        Denies vision changes and neuropathy   - stopped zocor for cholesterol     had increased heart burn on it          Anxiety previously was started on Zoloft 50 mg he noted an improvement in depression. Stopped medicine and had increased symptoms now back on medicine    found out girlfriend was not faithful last year not in a relationship      Complains of pain in feet heels and plantar area  \" works many hours standing for [employer] company\"        Review of systems:  Constitutional: negative for fever, chills, weight loss, night sweats   10 systems reviewed and negative other then HPI     Past Medical History:   Diagnosis Date    Depression     Erectile dysfunction     Type 2 diabetes mellitus (HCC)         Past Surgical History:   Procedure Laterality Date    VASECTOMY         No Known Allergies    Current Outpatient Medications on File Prior to Visit   Medication Sig Dispense Refill    metFORMIN (GLUCOPHAGE) 500 MG tablet TAKE 2 TABLETS BY MOUTH TWICE A DAY WITH MEALS 180 tablet 0    sildenafil (REVATIO) 20 MG tablet Take 1 tablet by mouth daily 30 tablet 0    sertraline (ZOLOFT) 50 MG tablet Take 1 tablet by mouth daily 90 tablet 0    insulin glargine (BASAGLAR KWIKPEN) 100 UNIT/ML injection pen INJECT 50 UNITS UNDER THE

## 2024-02-07 RX ORDER — PEN NEEDLE, DIABETIC 31 GX5/16"
NEEDLE, DISPOSABLE MISCELLANEOUS
Qty: 100 EACH | Refills: 11 | Status: SHIPPED | OUTPATIENT
Start: 2024-02-07

## 2024-02-07 RX ORDER — INSULIN GLARGINE 100 [IU]/ML
56 INJECTION, SOLUTION SUBCUTANEOUS NIGHTLY
Qty: 6 ADJUSTABLE DOSE PRE-FILLED PEN SYRINGE | Refills: 5 | Status: SHIPPED | OUTPATIENT
Start: 2024-02-07

## 2024-02-12 RX ORDER — SILDENAFIL CITRATE 20 MG/1
20 TABLET ORAL DAILY
Qty: 30 TABLET | Refills: 0 | Status: SHIPPED | OUTPATIENT
Start: 2024-02-12

## 2024-02-22 ENCOUNTER — PATIENT MESSAGE (OUTPATIENT)
Age: 39
End: 2024-02-22

## 2024-02-22 DIAGNOSIS — Z79.4 TYPE 2 DIABETES MELLITUS WITH HYPERGLYCEMIA, WITH LONG-TERM CURRENT USE OF INSULIN (HCC): Primary | ICD-10-CM

## 2024-02-22 DIAGNOSIS — E11.65 TYPE 2 DIABETES MELLITUS WITH HYPERGLYCEMIA, WITH LONG-TERM CURRENT USE OF INSULIN (HCC): Primary | ICD-10-CM

## 2024-02-26 RX ORDER — DULAGLUTIDE 0.75 MG/.5ML
0.75 INJECTION, SOLUTION SUBCUTANEOUS WEEKLY
Qty: 4 ADJUSTABLE DOSE PRE-FILLED PEN SYRINGE | Refills: 2 | Status: SHIPPED | OUTPATIENT
Start: 2024-02-26

## 2024-02-26 NOTE — TELEPHONE ENCOUNTER
From: Chano Lacy  To: Dr. Laurie Jett  Sent: 2/22/2024 12:14 PM EST  Subject: 2 week sugar levels    Hello Dr Trinidad,    I have attached a PDF of my sugar reports my fasting levels stay around the mid 200’s and after eating can get very high. The results are regardless of fast food or fish and veggies.

## 2024-02-29 RX ORDER — LANCETS 30 GAUGE
1 EACH MISCELLANEOUS DAILY
Qty: 100 EACH | Refills: 5 | Status: SHIPPED | OUTPATIENT
Start: 2024-02-29

## 2024-02-29 RX ORDER — GLUCOSAMINE HCL/CHONDROITIN SU 500-400 MG
CAPSULE ORAL
Qty: 100 STRIP | Refills: 0 | Status: SHIPPED | OUTPATIENT
Start: 2024-02-29 | End: 2024-03-01 | Stop reason: SDUPTHER

## 2024-03-01 DIAGNOSIS — E11.65 TYPE 2 DIABETES MELLITUS WITH HYPERGLYCEMIA, WITH LONG-TERM CURRENT USE OF INSULIN (HCC): ICD-10-CM

## 2024-03-01 DIAGNOSIS — Z79.4 TYPE 2 DIABETES MELLITUS WITH HYPERGLYCEMIA, WITH LONG-TERM CURRENT USE OF INSULIN (HCC): ICD-10-CM

## 2024-03-01 RX ORDER — GLUCOSAMINE HCL/CHONDROITIN SU 500-400 MG
CAPSULE ORAL
Qty: 100 STRIP | Refills: 0 | Status: SHIPPED | OUTPATIENT
Start: 2024-03-01

## 2024-03-01 NOTE — TELEPHONE ENCOUNTER
PCP: Laurie Barry MD    Last appt:   2/6/2024    No future appointments.    Requested Prescriptions     Pending Prescriptions Disp Refills    blood glucose monitor strips 100 strip 0     Sig: Test strips 3 times a day & as needed for symptoms of irregular blood glucose. Dispense sufficient amount for indicated testing frequency plus additional to accommodate PRN testing needs.

## 2024-03-18 RX ORDER — SILDENAFIL CITRATE 20 MG/1
20 TABLET ORAL DAILY
Qty: 30 TABLET | Refills: 0 | Status: SHIPPED | OUTPATIENT
Start: 2024-03-18

## 2024-04-08 RX ORDER — SILDENAFIL CITRATE 20 MG/1
20 TABLET ORAL DAILY
Qty: 30 TABLET | Refills: 0 | Status: SHIPPED | OUTPATIENT
Start: 2024-04-08

## 2024-05-11 DIAGNOSIS — Z79.4 TYPE 2 DIABETES MELLITUS WITH HYPERGLYCEMIA, WITH LONG-TERM CURRENT USE OF INSULIN (HCC): ICD-10-CM

## 2024-05-11 DIAGNOSIS — E11.65 TYPE 2 DIABETES MELLITUS WITH HYPERGLYCEMIA, WITH LONG-TERM CURRENT USE OF INSULIN (HCC): ICD-10-CM

## 2024-05-13 RX ORDER — SILDENAFIL CITRATE 20 MG/1
20 TABLET ORAL DAILY
Qty: 30 TABLET | Refills: 0 | Status: SHIPPED | OUTPATIENT
Start: 2024-05-13

## 2024-05-13 RX ORDER — DULAGLUTIDE 0.75 MG/.5ML
INJECTION, SOLUTION SUBCUTANEOUS
Qty: 2 ML | Refills: 2 | Status: SHIPPED | OUTPATIENT
Start: 2024-05-13

## 2024-06-10 RX ORDER — SILDENAFIL CITRATE 20 MG/1
20 TABLET ORAL DAILY
Qty: 30 TABLET | Refills: 0 | Status: SHIPPED | OUTPATIENT
Start: 2024-06-10

## 2024-07-08 RX ORDER — SILDENAFIL CITRATE 20 MG/1
20 TABLET ORAL DAILY
Qty: 30 TABLET | Refills: 0 | Status: SHIPPED | OUTPATIENT
Start: 2024-07-08

## 2024-07-29 DIAGNOSIS — Z79.4 TYPE 2 DIABETES MELLITUS WITH HYPERGLYCEMIA, WITH LONG-TERM CURRENT USE OF INSULIN (HCC): ICD-10-CM

## 2024-07-29 DIAGNOSIS — Z79.4 TYPE 2 DIABETES MELLITUS WITHOUT COMPLICATION, WITH LONG-TERM CURRENT USE OF INSULIN (HCC): ICD-10-CM

## 2024-07-29 DIAGNOSIS — E11.65 TYPE 2 DIABETES MELLITUS WITH HYPERGLYCEMIA, WITH LONG-TERM CURRENT USE OF INSULIN (HCC): ICD-10-CM

## 2024-07-29 DIAGNOSIS — E11.9 TYPE 2 DIABETES MELLITUS WITHOUT COMPLICATION, WITH LONG-TERM CURRENT USE OF INSULIN (HCC): ICD-10-CM

## 2024-07-29 RX ORDER — DULAGLUTIDE 0.75 MG/.5ML
INJECTION, SOLUTION SUBCUTANEOUS
Qty: 2 ML | Refills: 2 | Status: SHIPPED | OUTPATIENT
Start: 2024-07-29

## 2024-07-29 RX ORDER — INSULIN GLARGINE 100 [IU]/ML
INJECTION, SOLUTION SUBCUTANEOUS
Qty: 18 ML | Refills: 5 | Status: SHIPPED | OUTPATIENT
Start: 2024-07-29

## 2024-07-29 RX ORDER — SILDENAFIL CITRATE 20 MG/1
20 TABLET ORAL DAILY
Qty: 30 TABLET | Refills: 0 | Status: SHIPPED | OUTPATIENT
Start: 2024-07-29

## 2024-08-21 RX ORDER — SILDENAFIL CITRATE 20 MG/1
20 TABLET ORAL DAILY
Qty: 30 TABLET | Refills: 0 | Status: SHIPPED | OUTPATIENT
Start: 2024-08-21

## 2024-09-30 RX ORDER — SILDENAFIL CITRATE 20 MG/1
20 TABLET ORAL DAILY
Qty: 30 TABLET | Refills: 0 | Status: SHIPPED | OUTPATIENT
Start: 2024-09-30

## 2024-10-29 RX ORDER — SILDENAFIL CITRATE 20 MG/1
20 TABLET ORAL DAILY
Qty: 30 TABLET | Refills: 0 | Status: SHIPPED | OUTPATIENT
Start: 2024-10-29

## 2024-11-14 RX ORDER — SILDENAFIL CITRATE 20 MG/1
20 TABLET ORAL DAILY
Qty: 30 TABLET | Refills: 0 | Status: SHIPPED | OUTPATIENT
Start: 2024-11-14

## 2024-11-25 ENCOUNTER — PATIENT MESSAGE (OUTPATIENT)
Age: 39
End: 2024-11-25

## 2024-11-25 DIAGNOSIS — Z79.4 TYPE 2 DIABETES MELLITUS WITH HYPERGLYCEMIA, WITH LONG-TERM CURRENT USE OF INSULIN (HCC): Primary | ICD-10-CM

## 2024-11-25 DIAGNOSIS — E11.65 TYPE 2 DIABETES MELLITUS WITH HYPERGLYCEMIA, WITH LONG-TERM CURRENT USE OF INSULIN (HCC): Primary | ICD-10-CM

## 2024-12-02 RX ORDER — DULAGLUTIDE 1.5 MG/.5ML
1.5 INJECTION, SOLUTION SUBCUTANEOUS WEEKLY
Qty: 4 ADJUSTABLE DOSE PRE-FILLED PEN SYRINGE | Refills: 4 | Status: SHIPPED | OUTPATIENT
Start: 2024-12-02

## 2024-12-17 DIAGNOSIS — E11.65 TYPE 2 DIABETES MELLITUS WITH HYPERGLYCEMIA, WITH LONG-TERM CURRENT USE OF INSULIN (HCC): ICD-10-CM

## 2024-12-17 DIAGNOSIS — Z79.4 TYPE 2 DIABETES MELLITUS WITH HYPERGLYCEMIA, WITH LONG-TERM CURRENT USE OF INSULIN (HCC): ICD-10-CM

## 2024-12-17 RX ORDER — DULAGLUTIDE 1.5 MG/.5ML
1.5 INJECTION, SOLUTION SUBCUTANEOUS WEEKLY
Qty: 4 ADJUSTABLE DOSE PRE-FILLED PEN SYRINGE | Refills: 4 | Status: SHIPPED | OUTPATIENT
Start: 2024-12-17

## 2024-12-17 RX ORDER — SILDENAFIL CITRATE 20 MG/1
20 TABLET ORAL DAILY
Qty: 30 TABLET | Refills: 0 | Status: SHIPPED | OUTPATIENT
Start: 2024-12-17

## 2025-01-08 DIAGNOSIS — Z79.4 TYPE 2 DIABETES MELLITUS WITH HYPERGLYCEMIA, WITH LONG-TERM CURRENT USE OF INSULIN (HCC): ICD-10-CM

## 2025-01-08 DIAGNOSIS — E11.65 TYPE 2 DIABETES MELLITUS WITH HYPERGLYCEMIA, WITH LONG-TERM CURRENT USE OF INSULIN (HCC): ICD-10-CM

## 2025-01-09 RX ORDER — SILDENAFIL CITRATE 20 MG/1
20 TABLET ORAL DAILY
Qty: 30 TABLET | Refills: 0 | Status: SHIPPED | OUTPATIENT
Start: 2025-01-09

## 2025-01-09 RX ORDER — DULAGLUTIDE 1.5 MG/.5ML
1.5 INJECTION, SOLUTION SUBCUTANEOUS WEEKLY
Qty: 4 ADJUSTABLE DOSE PRE-FILLED PEN SYRINGE | Refills: 4 | Status: SHIPPED | OUTPATIENT
Start: 2025-01-09

## 2025-02-10 RX ORDER — SILDENAFIL CITRATE 20 MG/1
20 TABLET ORAL DAILY
Qty: 30 TABLET | Refills: 0 | Status: SHIPPED | OUTPATIENT
Start: 2025-02-10

## 2025-02-14 ENCOUNTER — OFFICE VISIT (OUTPATIENT)
Age: 40
End: 2025-02-14

## 2025-02-14 VITALS
OXYGEN SATURATION: 96 % | BODY MASS INDEX: 42.8 KG/M2 | DIASTOLIC BLOOD PRESSURE: 79 MMHG | RESPIRATION RATE: 20 BRPM | SYSTOLIC BLOOD PRESSURE: 116 MMHG | HEART RATE: 84 BPM | TEMPERATURE: 97.2 F | HEIGHT: 68 IN | WEIGHT: 282.4 LBS

## 2025-02-14 DIAGNOSIS — E11.65 TYPE 2 DIABETES MELLITUS WITH HYPERGLYCEMIA, WITH LONG-TERM CURRENT USE OF INSULIN (HCC): Primary | ICD-10-CM

## 2025-02-14 DIAGNOSIS — Z79.4 TYPE 2 DIABETES MELLITUS WITH HYPERGLYCEMIA, WITH LONG-TERM CURRENT USE OF INSULIN (HCC): Primary | ICD-10-CM

## 2025-02-14 DIAGNOSIS — E78.00 HIGH CHOLESTEROL: ICD-10-CM

## 2025-02-14 DIAGNOSIS — Z23 NEEDS FLU SHOT: ICD-10-CM

## 2025-02-14 DIAGNOSIS — Z00.00 ANNUAL PHYSICAL EXAM: ICD-10-CM

## 2025-02-14 DIAGNOSIS — Z23 ENCOUNTER FOR IMMUNIZATION: ICD-10-CM

## 2025-02-14 RX ORDER — INSULIN GLARGINE 100 [IU]/ML
45 INJECTION, SOLUTION SUBCUTANEOUS NIGHTLY
Qty: 18 ML | Refills: 5
Start: 2025-02-14

## 2025-02-14 RX ORDER — ACYCLOVIR 800 MG/1
1 TABLET ORAL
Qty: 2 EACH | Refills: 2 | Status: SHIPPED | OUTPATIENT
Start: 2025-02-14

## 2025-02-14 RX ORDER — ROSUVASTATIN CALCIUM 5 MG/1
5 TABLET, COATED ORAL NIGHTLY
Qty: 90 TABLET | Refills: 3 | Status: SHIPPED | OUTPATIENT
Start: 2025-02-14

## 2025-02-14 RX ORDER — DULAGLUTIDE 1.5 MG/.5ML
1.5 INJECTION, SOLUTION SUBCUTANEOUS WEEKLY
Qty: 4 ADJUSTABLE DOSE PRE-FILLED PEN SYRINGE | Refills: 4 | Status: SHIPPED | OUTPATIENT
Start: 2025-02-14

## 2025-02-14 SDOH — ECONOMIC STABILITY: FOOD INSECURITY: WITHIN THE PAST 12 MONTHS, THE FOOD YOU BOUGHT JUST DIDN'T LAST AND YOU DIDN'T HAVE MONEY TO GET MORE.: NEVER TRUE

## 2025-02-14 SDOH — ECONOMIC STABILITY: FOOD INSECURITY: WITHIN THE PAST 12 MONTHS, YOU WORRIED THAT YOUR FOOD WOULD RUN OUT BEFORE YOU GOT MONEY TO BUY MORE.: NEVER TRUE

## 2025-02-14 ASSESSMENT — PATIENT HEALTH QUESTIONNAIRE - PHQ9
SUM OF ALL RESPONSES TO PHQ9 QUESTIONS 1 & 2: 2
SUM OF ALL RESPONSES TO PHQ QUESTIONS 1-9: 4
SUM OF ALL RESPONSES TO PHQ QUESTIONS 1-9: 4
10. IF YOU CHECKED OFF ANY PROBLEMS, HOW DIFFICULT HAVE THESE PROBLEMS MADE IT FOR YOU TO DO YOUR WORK, TAKE CARE OF THINGS AT HOME, OR GET ALONG WITH OTHER PEOPLE: SOMEWHAT DIFFICULT
9. THOUGHTS THAT YOU WOULD BE BETTER OFF DEAD, OR OF HURTING YOURSELF: NOT AT ALL
7. TROUBLE CONCENTRATING ON THINGS, SUCH AS READING THE NEWSPAPER OR WATCHING TELEVISION: NOT AT ALL
6. FEELING BAD ABOUT YOURSELF - OR THAT YOU ARE A FAILURE OR HAVE LET YOURSELF OR YOUR FAMILY DOWN: NOT AT ALL
8. MOVING OR SPEAKING SO SLOWLY THAT OTHER PEOPLE COULD HAVE NOTICED. OR THE OPPOSITE, BEING SO FIGETY OR RESTLESS THAT YOU HAVE BEEN MOVING AROUND A LOT MORE THAN USUAL: NOT AT ALL
SUM OF ALL RESPONSES TO PHQ QUESTIONS 1-9: 4
1. LITTLE INTEREST OR PLEASURE IN DOING THINGS: NOT AT ALL
2. FEELING DOWN, DEPRESSED OR HOPELESS: MORE THAN HALF THE DAYS
5. POOR APPETITE OR OVEREATING: NOT AT ALL
SUM OF ALL RESPONSES TO PHQ QUESTIONS 1-9: 4
3. TROUBLE FALLING OR STAYING ASLEEP: MORE THAN HALF THE DAYS
4. FEELING TIRED OR HAVING LITTLE ENERGY: NOT AT ALL

## 2025-02-14 NOTE — PROGRESS NOTES
\"Have you been to the ER, urgent care clinic since your last visit?  Hospitalized since your last visit?\"    NO    “Have you seen or consulted any other health care providers outside our system since your last visit?”    NO      “Have you had a diabetic eye exam?”    NO     No diabetic eye exam on file          
5    B-D ULTRAFINE III SHORT PEN 31G X 8 MM MISC USE ONCE DAILY 100 each 11    Insulin Pen Needle 31G X 4 MM MISC Inject into the skin daily      sildenafil (REVATIO) 20 MG tablet TAKE ONE TABLET BY MOUTH ONE TIME DAILY 30 tablet 0     No current facility-administered medications for this visit.      Physical exam  General:  Well appearing adult no acute distress  HEENT:   PERRL,normal conjunctiva. External ear and canals normal, TMs normal.  Hearing normal to voice.  Nose without edema or discharge, normal septum.  Lips, teeth, gums normal.  Oropharynx: no erythema, no exudates, no lesions, normal tongue.  Neck:  Supple. Thyroid normal size, nontender, without nodules.  No carotid bruit. No masses or lymphadenopathy  Respiratory: no respiratory distress,  no wheezing, no rhonchi, no rales. No chest wall tenderness.  Cardiovascular:  RRR, normal S1S2, no murmur.    Gastrointestinal: normal bowel sounds, soft, nontender, without masses.  No hepatosplenomegaly.  Extremities +2 pulses, no edema, normal sensation   Musculoskeletal:  Normal gait. Normal digits and nails.  Normal strength and tone, no atrophy, and no abnormal movement.  Skin:  No rash, no lesions, no ulcers.  Skin warm, normal turgor, without induration or nodules.  Neuro:  A and OX4, fluent speech, cranial nerves normal 2-12.    Psych:  Normal affect         Monofilament R - normal sensation with micro filament  L - normal sensation with micro filament   Pulse DP R - 2+ (normal)  L - 2+ (normal)   Pulse TP R - 2+ (normal)  L - 2+ (normal)   Structural deformity R - None  L - None   Skin Integrity / Deformity R - None  L - None    Objective   Blood pressure 116/79, pulse 84, temperature 97.2 °F (36.2 °C), resp. rate 20, height 1.727 m (5' 8\"), weight 128.1 kg (282 lb 6.4 oz), SpO2 96%.  Physical Exam  Lungs were auscultated.    Vital Signs  Weight is 282 pounds. Blood pressure is normal.    Specialty Testing  Foot exam was performed.           The patient

## 2025-02-14 NOTE — PATIENT INSTRUCTIONS
Keep up good work  Start crestor 5mg at bedtime for cholesterol check hepatic panel ( blood work) in 6 weeks

## 2025-02-15 LAB
ALBUMIN SERPL-MCNC: 4.2 G/DL (ref 3.5–5)
ALBUMIN/GLOB SERPL: 1.2 (ref 1.1–2.2)
ALP SERPL-CCNC: 111 U/L (ref 45–117)
ALT SERPL-CCNC: 69 U/L (ref 12–78)
ANION GAP SERPL CALC-SCNC: 7 MMOL/L (ref 2–12)
AST SERPL-CCNC: 35 U/L (ref 15–37)
BASOPHILS # BLD: 0.03 K/UL (ref 0–0.1)
BASOPHILS NFR BLD: 0.4 % (ref 0–1)
BILIRUB SERPL-MCNC: 0.7 MG/DL (ref 0.2–1)
BUN SERPL-MCNC: 26 MG/DL (ref 6–20)
BUN/CREAT SERPL: 27 (ref 12–20)
CALCIUM SERPL-MCNC: 9.6 MG/DL (ref 8.5–10.1)
CHLORIDE SERPL-SCNC: 107 MMOL/L (ref 97–108)
CHOLEST SERPL-MCNC: 186 MG/DL
CO2 SERPL-SCNC: 25 MMOL/L (ref 21–32)
CREAT SERPL-MCNC: 0.96 MG/DL (ref 0.7–1.3)
CREAT UR-MCNC: 184 MG/DL
DIFFERENTIAL METHOD BLD: NORMAL
EOSINOPHIL # BLD: 0.15 K/UL (ref 0–0.4)
EOSINOPHIL NFR BLD: 1.8 % (ref 0–7)
ERYTHROCYTE [DISTWIDTH] IN BLOOD BY AUTOMATED COUNT: 12.5 % (ref 11.5–14.5)
EST. AVERAGE GLUCOSE BLD GHB EST-MCNC: 143 MG/DL
GLOBULIN SER CALC-MCNC: 3.6 G/DL (ref 2–4)
GLUCOSE SERPL-MCNC: 103 MG/DL (ref 65–100)
HBA1C MFR BLD: 6.6 % (ref 4–5.6)
HCT VFR BLD AUTO: 44.6 % (ref 36.6–50.3)
HDLC SERPL-MCNC: 38 MG/DL
HDLC SERPL: 4.9 (ref 0–5)
HGB BLD-MCNC: 14.2 G/DL (ref 12.1–17)
IMM GRANULOCYTES # BLD AUTO: 0.03 K/UL (ref 0–0.04)
IMM GRANULOCYTES NFR BLD AUTO: 0.4 % (ref 0–0.5)
LDLC SERPL CALC-MCNC: 126 MG/DL (ref 0–100)
LYMPHOCYTES # BLD: 1.64 K/UL (ref 0.8–3.5)
LYMPHOCYTES NFR BLD: 19.9 % (ref 12–49)
MCH RBC QN AUTO: 29.9 PG (ref 26–34)
MCHC RBC AUTO-ENTMCNC: 31.8 G/DL (ref 30–36.5)
MCV RBC AUTO: 93.9 FL (ref 80–99)
MICROALBUMIN UR-MCNC: 0.8 MG/DL
MICROALBUMIN/CREAT UR-RTO: 4 MG/G (ref 0–30)
MONOCYTES # BLD: 0.74 K/UL (ref 0–1)
MONOCYTES NFR BLD: 9 % (ref 5–13)
NEUTS SEG # BLD: 5.64 K/UL (ref 1.8–8)
NEUTS SEG NFR BLD: 68.5 % (ref 32–75)
NRBC # BLD: 0 K/UL (ref 0–0.01)
NRBC BLD-RTO: 0 PER 100 WBC
PLATELET # BLD AUTO: 293 K/UL (ref 150–400)
PMV BLD AUTO: 10.5 FL (ref 8.9–12.9)
POTASSIUM SERPL-SCNC: 4.4 MMOL/L (ref 3.5–5.1)
PROT SERPL-MCNC: 7.8 G/DL (ref 6.4–8.2)
RBC # BLD AUTO: 4.75 M/UL (ref 4.1–5.7)
SODIUM SERPL-SCNC: 139 MMOL/L (ref 136–145)
SPECIMEN HOLD: NORMAL
TRIGL SERPL-MCNC: 110 MG/DL
VLDLC SERPL CALC-MCNC: 22 MG/DL
WBC # BLD AUTO: 8.2 K/UL (ref 4.1–11.1)

## 2025-02-19 RX ORDER — SILDENAFIL CITRATE 20 MG/1
20 TABLET ORAL DAILY
Qty: 30 TABLET | Refills: 0 | Status: SHIPPED | OUTPATIENT
Start: 2025-02-19

## 2025-03-13 RX ORDER — SILDENAFIL CITRATE 20 MG/1
20 TABLET ORAL DAILY
Qty: 30 TABLET | Refills: 0 | Status: SHIPPED | OUTPATIENT
Start: 2025-03-13

## 2025-04-14 RX ORDER — SILDENAFIL CITRATE 20 MG/1
20 TABLET ORAL DAILY
Qty: 30 TABLET | Refills: 0 | Status: SHIPPED | OUTPATIENT
Start: 2025-04-14

## 2025-05-09 DIAGNOSIS — Z79.4 TYPE 2 DIABETES MELLITUS WITH HYPERGLYCEMIA, WITH LONG-TERM CURRENT USE OF INSULIN (HCC): ICD-10-CM

## 2025-05-09 DIAGNOSIS — E11.65 TYPE 2 DIABETES MELLITUS WITH HYPERGLYCEMIA, WITH LONG-TERM CURRENT USE OF INSULIN (HCC): ICD-10-CM

## 2025-05-09 RX ORDER — SILDENAFIL CITRATE 20 MG/1
20 TABLET ORAL DAILY
Qty: 30 TABLET | Refills: 0 | Status: SHIPPED | OUTPATIENT
Start: 2025-05-09

## 2025-05-09 RX ORDER — ACYCLOVIR 800 MG/1
TABLET ORAL
Qty: 2 EACH | Refills: 2 | Status: SHIPPED | OUTPATIENT
Start: 2025-05-09

## 2025-05-21 RX ORDER — SILDENAFIL CITRATE 20 MG/1
20 TABLET ORAL DAILY
Qty: 30 TABLET | Refills: 0 | OUTPATIENT
Start: 2025-05-21

## 2025-06-08 RX ORDER — SILDENAFIL CITRATE 20 MG/1
20 TABLET ORAL DAILY
Qty: 30 TABLET | Refills: 0 | Status: SHIPPED | OUTPATIENT
Start: 2025-06-08

## 2025-07-07 RX ORDER — SILDENAFIL CITRATE 20 MG/1
20 TABLET ORAL DAILY
Qty: 30 TABLET | Refills: 0 | Status: SHIPPED | OUTPATIENT
Start: 2025-07-07

## 2025-07-07 NOTE — TELEPHONE ENCOUNTER
PCP: Laurie Barry MD    Last appt:   2/14/2025    Future Appointments   Date Time Provider Department Center   8/21/2025  8:15 AM Laurie Barry MD [facility] DEP       Requested Prescriptions     Pending Prescriptions Disp Refills    sildenafil (REVATIO) 20 MG tablet [Pharmacy Med Name: SILDENAFIL 20 MG TAB[*]] 30 tablet 0     Sig: TAKE ONE TABLET BY MOUTH ONE TIME DAILY

## 2025-08-08 DIAGNOSIS — Z79.4 TYPE 2 DIABETES MELLITUS WITH HYPERGLYCEMIA, WITH LONG-TERM CURRENT USE OF INSULIN (HCC): ICD-10-CM

## 2025-08-08 DIAGNOSIS — E11.65 TYPE 2 DIABETES MELLITUS WITH HYPERGLYCEMIA, WITH LONG-TERM CURRENT USE OF INSULIN (HCC): ICD-10-CM

## 2025-08-08 RX ORDER — DULAGLUTIDE 0.75 MG/.5ML
INJECTION, SOLUTION SUBCUTANEOUS
Qty: 2 ML | Refills: 2 | Status: SHIPPED | OUTPATIENT
Start: 2025-08-08

## 2025-08-08 RX ORDER — SILDENAFIL CITRATE 20 MG/1
20 TABLET ORAL DAILY
Qty: 30 TABLET | Refills: 0 | Status: SHIPPED | OUTPATIENT
Start: 2025-08-08

## 2025-08-21 ENCOUNTER — OFFICE VISIT (OUTPATIENT)
Age: 40
End: 2025-08-21
Payer: COMMERCIAL

## 2025-08-21 VITALS
DIASTOLIC BLOOD PRESSURE: 86 MMHG | HEART RATE: 84 BPM | HEIGHT: 68 IN | SYSTOLIC BLOOD PRESSURE: 126 MMHG | WEIGHT: 272.13 LBS | BODY MASS INDEX: 41.24 KG/M2 | TEMPERATURE: 97.2 F | OXYGEN SATURATION: 99 %

## 2025-08-21 DIAGNOSIS — R09.82 POST-NASAL DRIP: ICD-10-CM

## 2025-08-21 DIAGNOSIS — E11.65 TYPE 2 DIABETES MELLITUS WITH HYPERGLYCEMIA, WITH LONG-TERM CURRENT USE OF INSULIN (HCC): Primary | ICD-10-CM

## 2025-08-21 DIAGNOSIS — Z79.4 TYPE 2 DIABETES MELLITUS WITH HYPERGLYCEMIA, WITH LONG-TERM CURRENT USE OF INSULIN (HCC): Primary | ICD-10-CM

## 2025-08-21 DIAGNOSIS — E78.00 HIGH CHOLESTEROL: ICD-10-CM

## 2025-08-21 DIAGNOSIS — E66.01 MORBID (SEVERE) OBESITY DUE TO EXCESS CALORIES (HCC): ICD-10-CM

## 2025-08-21 LAB
ALBUMIN SERPL-MCNC: 4.2 G/DL (ref 3.5–5.2)
ALBUMIN/GLOB SERPL: 1.3 (ref 1.1–2.2)
ALP SERPL-CCNC: 102 U/L (ref 40–129)
ALT SERPL-CCNC: 56 U/L (ref 10–50)
ANION GAP SERPL CALC-SCNC: 11 MMOL/L (ref 2–14)
AST SERPL-CCNC: 32 U/L (ref 10–50)
BASOPHILS # BLD: 0.03 K/UL (ref 0–0.1)
BASOPHILS NFR BLD: 0.4 % (ref 0–1)
BILIRUB SERPL-MCNC: 0.8 MG/DL (ref 0–1.2)
BUN SERPL-MCNC: 18 MG/DL (ref 6–20)
BUN/CREAT SERPL: 24 (ref 12–20)
CALCIUM SERPL-MCNC: 9.4 MG/DL (ref 8.6–10)
CHLORIDE SERPL-SCNC: 103 MMOL/L (ref 98–107)
CHOLEST SERPL-MCNC: 155 MG/DL (ref 0–200)
CO2 SERPL-SCNC: 25 MMOL/L (ref 20–29)
CREAT SERPL-MCNC: 0.76 MG/DL (ref 0.7–1.2)
DIFFERENTIAL METHOD BLD: NORMAL
EOSINOPHIL # BLD: 0.12 K/UL (ref 0–0.4)
EOSINOPHIL NFR BLD: 1.8 % (ref 0–7)
ERYTHROCYTE [DISTWIDTH] IN BLOOD BY AUTOMATED COUNT: 12.6 % (ref 11.5–14.5)
EST. AVERAGE GLUCOSE BLD GHB EST-MCNC: 129 MG/DL
GLOBULIN SER CALC-MCNC: 3.1 G/DL (ref 2–4)
GLUCOSE SERPL-MCNC: 120 MG/DL (ref 65–100)
HBA1C MFR BLD: 6.1 % (ref 4–5.6)
HCT VFR BLD AUTO: 45.3 % (ref 36.6–50.3)
HDLC SERPL-MCNC: 40 MG/DL (ref 40–60)
HDLC SERPL: 3.9 (ref 0–5)
HGB BLD-MCNC: 14.5 G/DL (ref 12.1–17)
IMM GRANULOCYTES # BLD AUTO: 0.03 K/UL (ref 0–0.04)
IMM GRANULOCYTES NFR BLD AUTO: 0.4 % (ref 0–0.5)
LDLC SERPL CALC-MCNC: 98 MG/DL (ref 0–100)
LYMPHOCYTES # BLD: 1.64 K/UL (ref 0.8–3.5)
LYMPHOCYTES NFR BLD: 24.4 % (ref 12–49)
MCH RBC QN AUTO: 30.7 PG (ref 26–34)
MCHC RBC AUTO-ENTMCNC: 32 G/DL (ref 30–36.5)
MCV RBC AUTO: 96 FL (ref 80–99)
MONOCYTES # BLD: 0.6 K/UL (ref 0–1)
MONOCYTES NFR BLD: 8.9 % (ref 5–13)
NEUTS SEG # BLD: 4.29 K/UL (ref 1.8–8)
NEUTS SEG NFR BLD: 64.1 % (ref 32–75)
NRBC # BLD: 0 K/UL (ref 0–0.01)
NRBC BLD-RTO: 0 PER 100 WBC
PLATELET # BLD AUTO: 291 K/UL (ref 150–400)
PMV BLD AUTO: 10.2 FL (ref 8.9–12.9)
POTASSIUM SERPL-SCNC: 4.6 MMOL/L (ref 3.5–5.1)
PROT SERPL-MCNC: 7.3 G/DL (ref 6.4–8.3)
RBC # BLD AUTO: 4.72 M/UL (ref 4.1–5.7)
SODIUM SERPL-SCNC: 139 MMOL/L (ref 136–145)
TRIGL SERPL-MCNC: 83 MG/DL (ref 0–150)
VLDLC SERPL CALC-MCNC: 17 MG/DL
WBC # BLD AUTO: 6.7 K/UL (ref 4.1–11.1)

## 2025-08-21 PROCEDURE — 3044F HG A1C LEVEL LT 7.0%: CPT | Performed by: INTERNAL MEDICINE

## 2025-08-21 PROCEDURE — 99214 OFFICE O/P EST MOD 30 MIN: CPT | Performed by: INTERNAL MEDICINE

## 2025-08-21 RX ORDER — FLUTICASONE PROPIONATE 50 MCG
2 SPRAY, SUSPENSION (ML) NASAL DAILY
Qty: 48 G | Refills: 1 | Status: SHIPPED | OUTPATIENT
Start: 2025-08-21

## 2025-09-02 RX ORDER — SILDENAFIL CITRATE 20 MG/1
20 TABLET ORAL DAILY
Qty: 30 TABLET | Refills: 0 | Status: SHIPPED | OUTPATIENT
Start: 2025-09-02